# Patient Record
Sex: FEMALE | Race: WHITE | NOT HISPANIC OR LATINO
[De-identification: names, ages, dates, MRNs, and addresses within clinical notes are randomized per-mention and may not be internally consistent; named-entity substitution may affect disease eponyms.]

---

## 2017-06-21 ENCOUNTER — APPOINTMENT (OUTPATIENT)
Dept: SPINE | Facility: CLINIC | Age: 47
End: 2017-06-21

## 2017-06-21 VITALS
HEART RATE: 90 BPM | BODY MASS INDEX: 22.82 KG/M2 | HEIGHT: 66 IN | DIASTOLIC BLOOD PRESSURE: 74 MMHG | WEIGHT: 142 LBS | SYSTOLIC BLOOD PRESSURE: 107 MMHG

## 2017-06-21 DIAGNOSIS — Z85.9 PERSONAL HISTORY OF MALIGNANT NEOPLASM, UNSPECIFIED: ICD-10-CM

## 2017-06-21 DIAGNOSIS — Z86.39 PERSONAL HISTORY OF OTHER ENDOCRINE, NUTRITIONAL AND METABOLIC DISEASE: ICD-10-CM

## 2017-06-21 DIAGNOSIS — Z81.8 FAMILY HISTORY OF OTHER MENTAL AND BEHAVIORAL DISORDERS: ICD-10-CM

## 2017-06-21 DIAGNOSIS — Z80.0 FAMILY HISTORY OF MALIGNANT NEOPLASM OF DIGESTIVE ORGANS: ICD-10-CM

## 2017-06-21 RX ORDER — OXYCODONE HYDROCHLORIDE AND ACETAMINOPHEN 10; 325 MG/1; MG/1
10-325 TABLET ORAL
Refills: 0 | Status: ACTIVE | COMMUNITY

## 2017-07-12 ENCOUNTER — APPOINTMENT (OUTPATIENT)
Dept: SPINE | Facility: CLINIC | Age: 47
End: 2017-07-12

## 2017-07-12 VITALS
BODY MASS INDEX: 22.5 KG/M2 | DIASTOLIC BLOOD PRESSURE: 67 MMHG | WEIGHT: 140 LBS | SYSTOLIC BLOOD PRESSURE: 104 MMHG | HEART RATE: 87 BPM | HEIGHT: 66 IN

## 2017-09-01 ENCOUNTER — OUTPATIENT (OUTPATIENT)
Dept: OUTPATIENT SERVICES | Facility: HOSPITAL | Age: 47
LOS: 1 days | End: 2017-09-01
Payer: COMMERCIAL

## 2017-09-01 VITALS
HEART RATE: 71 BPM | WEIGHT: 132.94 LBS | RESPIRATION RATE: 20 BRPM | OXYGEN SATURATION: 98 % | DIASTOLIC BLOOD PRESSURE: 78 MMHG | SYSTOLIC BLOOD PRESSURE: 113 MMHG | HEIGHT: 66 IN | TEMPERATURE: 98 F

## 2017-09-01 DIAGNOSIS — Z98.890 OTHER SPECIFIED POSTPROCEDURAL STATES: Chronic | ICD-10-CM

## 2017-09-01 DIAGNOSIS — M50.20 OTHER CERVICAL DISC DISPLACEMENT, UNSPECIFIED CERVICAL REGION: ICD-10-CM

## 2017-09-01 DIAGNOSIS — Z01.818 ENCOUNTER FOR OTHER PREPROCEDURAL EXAMINATION: ICD-10-CM

## 2017-09-01 DIAGNOSIS — Z98.1 ARTHRODESIS STATUS: Chronic | ICD-10-CM

## 2017-09-01 DIAGNOSIS — Z09 ENCOUNTER FOR FOLLOW-UP EXAMINATION AFTER COMPLETED TREATMENT FOR CONDITIONS OTHER THAN MALIGNANT NEOPLASM: Chronic | ICD-10-CM

## 2017-09-01 DIAGNOSIS — Z87.59 PERSONAL HISTORY OF OTHER COMPLICATIONS OF PREGNANCY, CHILDBIRTH AND THE PUERPERIUM: Chronic | ICD-10-CM

## 2017-09-01 DIAGNOSIS — Z90.89 ACQUIRED ABSENCE OF OTHER ORGANS: Chronic | ICD-10-CM

## 2017-09-01 DIAGNOSIS — E89.0 POSTPROCEDURAL HYPOTHYROIDISM: Chronic | ICD-10-CM

## 2017-09-01 DIAGNOSIS — Z98.82 BREAST IMPLANT STATUS: Chronic | ICD-10-CM

## 2017-09-01 LAB
BLD GP AB SCN SERPL QL: NEGATIVE — SIGNIFICANT CHANGE UP
HCT VFR BLD CALC: 48.1 % — HIGH (ref 34.5–45)
HGB BLD-MCNC: 15.4 G/DL — SIGNIFICANT CHANGE UP (ref 11.5–15.5)
MCHC RBC-ENTMCNC: 31.8 PG — SIGNIFICANT CHANGE UP (ref 27–34)
MCHC RBC-ENTMCNC: 32 GM/DL — SIGNIFICANT CHANGE UP (ref 32–36)
MCV RBC AUTO: 99.2 FL — SIGNIFICANT CHANGE UP (ref 80–100)
PLATELET # BLD AUTO: 284 K/UL — SIGNIFICANT CHANGE UP (ref 150–400)
RBC # BLD: 4.85 M/UL — SIGNIFICANT CHANGE UP (ref 3.8–5.2)
RBC # FLD: 12.9 % — SIGNIFICANT CHANGE UP (ref 10.3–14.5)
RH IG SCN BLD-IMP: POSITIVE — SIGNIFICANT CHANGE UP
WBC # BLD: 10.21 K/UL — SIGNIFICANT CHANGE UP (ref 3.8–10.5)
WBC # FLD AUTO: 10.21 K/UL — SIGNIFICANT CHANGE UP (ref 3.8–10.5)

## 2017-09-01 PROCEDURE — 86901 BLOOD TYPING SEROLOGIC RH(D): CPT

## 2017-09-01 PROCEDURE — 86900 BLOOD TYPING SEROLOGIC ABO: CPT

## 2017-09-01 PROCEDURE — 86850 RBC ANTIBODY SCREEN: CPT

## 2017-09-01 PROCEDURE — G0463: CPT

## 2017-09-01 PROCEDURE — 85027 COMPLETE CBC AUTOMATED: CPT

## 2017-09-01 RX ORDER — CEFAZOLIN SODIUM 1 G
2000 VIAL (EA) INJECTION ONCE
Qty: 0 | Refills: 0 | Status: DISCONTINUED | OUTPATIENT
Start: 2017-09-08 | End: 2017-09-10

## 2017-09-01 NOTE — H&P PST ADULT - ACTIVITY
minimal due to neck pain, light housework, gorcery shopping, taking care of son, works as phlebotomist

## 2017-09-01 NOTE — H&P PST ADULT - NSANTHOSAYNRD_GEN_A_CORE
No. SUN screening performed.  STOP BANG Legend: 0-2 = LOW Risk; 3-4 = INTERMEDIATE Risk; 5-8 = HIGH Risk
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
## 2017-09-01 NOTE — H&P PST ADULT - PSH
S/P breast augmentation  saline  S/P carpal tunnel release    S/P cervical spinal fusion  x2  S/P ectopic pregnancy    S/P thyroidectomy    S/P tonsillectomy    S/P umbilical hernia repair, follow-up exam

## 2017-09-04 ENCOUNTER — TRANSCRIPTION ENCOUNTER (OUTPATIENT)
Age: 47
End: 2017-09-04

## 2017-09-08 ENCOUNTER — APPOINTMENT (OUTPATIENT)
Dept: SPINE | Facility: HOSPITAL | Age: 47
End: 2017-09-08

## 2017-09-08 ENCOUNTER — APPOINTMENT (OUTPATIENT)
Dept: OTOLARYNGOLOGY | Facility: HOSPITAL | Age: 47
End: 2017-09-08

## 2017-09-08 ENCOUNTER — TRANSCRIPTION ENCOUNTER (OUTPATIENT)
Age: 47
End: 2017-09-08

## 2017-09-08 ENCOUNTER — INPATIENT (INPATIENT)
Facility: HOSPITAL | Age: 47
LOS: 1 days | Discharge: HOME | DRG: 473 | End: 2017-09-10
Attending: NEUROLOGICAL SURGERY | Admitting: NEUROLOGICAL SURGERY
Payer: COMMERCIAL

## 2017-09-08 VITALS
HEART RATE: 76 BPM | HEIGHT: 66 IN | OXYGEN SATURATION: 97 % | RESPIRATION RATE: 20 BRPM | SYSTOLIC BLOOD PRESSURE: 109 MMHG | DIASTOLIC BLOOD PRESSURE: 74 MMHG | TEMPERATURE: 98 F | WEIGHT: 132.94 LBS

## 2017-09-08 DIAGNOSIS — Z98.82 BREAST IMPLANT STATUS: Chronic | ICD-10-CM

## 2017-09-08 DIAGNOSIS — Z87.59 PERSONAL HISTORY OF OTHER COMPLICATIONS OF PREGNANCY, CHILDBIRTH AND THE PUERPERIUM: Chronic | ICD-10-CM

## 2017-09-08 DIAGNOSIS — M50.20 OTHER CERVICAL DISC DISPLACEMENT, UNSPECIFIED CERVICAL REGION: ICD-10-CM

## 2017-09-08 DIAGNOSIS — E89.0 POSTPROCEDURAL HYPOTHYROIDISM: Chronic | ICD-10-CM

## 2017-09-08 DIAGNOSIS — Z98.890 OTHER SPECIFIED POSTPROCEDURAL STATES: Chronic | ICD-10-CM

## 2017-09-08 DIAGNOSIS — Z90.89 ACQUIRED ABSENCE OF OTHER ORGANS: Chronic | ICD-10-CM

## 2017-09-08 DIAGNOSIS — Z09 ENCOUNTER FOR FOLLOW-UP EXAMINATION AFTER COMPLETED TREATMENT FOR CONDITIONS OTHER THAN MALIGNANT NEOPLASM: Chronic | ICD-10-CM

## 2017-09-08 DIAGNOSIS — Z98.1 ARTHRODESIS STATUS: Chronic | ICD-10-CM

## 2017-09-08 LAB
ANION GAP SERPL CALC-SCNC: 11 MMOL/L — SIGNIFICANT CHANGE UP (ref 5–17)
BUN SERPL-MCNC: 8 MG/DL — SIGNIFICANT CHANGE UP (ref 7–23)
CALCIUM SERPL-MCNC: 7.8 MG/DL — LOW (ref 8.4–10.5)
CHLORIDE SERPL-SCNC: 111 MMOL/L — HIGH (ref 96–108)
CO2 SERPL-SCNC: 21 MMOL/L — LOW (ref 22–31)
CREAT SERPL-MCNC: 0.63 MG/DL — SIGNIFICANT CHANGE UP (ref 0.5–1.3)
GLUCOSE SERPL-MCNC: 130 MG/DL — HIGH (ref 70–99)
HCG UR QL: NEGATIVE — SIGNIFICANT CHANGE UP
HCT VFR BLD CALC: 40.2 % — SIGNIFICANT CHANGE UP (ref 34.5–45)
HGB BLD-MCNC: 13.4 G/DL — SIGNIFICANT CHANGE UP (ref 11.5–15.5)
MAGNESIUM SERPL-MCNC: 1.7 MG/DL — SIGNIFICANT CHANGE UP (ref 1.6–2.6)
MCHC RBC-ENTMCNC: 33.2 GM/DL — SIGNIFICANT CHANGE UP (ref 32–36)
MCHC RBC-ENTMCNC: 33.5 PG — SIGNIFICANT CHANGE UP (ref 27–34)
MCV RBC AUTO: 101 FL — HIGH (ref 80–100)
PHOSPHATE SERPL-MCNC: 2.8 MG/DL — SIGNIFICANT CHANGE UP (ref 2.5–4.5)
PLATELET # BLD AUTO: 197 K/UL — SIGNIFICANT CHANGE UP (ref 150–400)
POTASSIUM SERPL-MCNC: 3.8 MMOL/L — SIGNIFICANT CHANGE UP (ref 3.5–5.3)
POTASSIUM SERPL-SCNC: 3.8 MMOL/L — SIGNIFICANT CHANGE UP (ref 3.5–5.3)
RBC # BLD: 3.99 M/UL — SIGNIFICANT CHANGE UP (ref 3.8–5.2)
RBC # FLD: 11.4 % — SIGNIFICANT CHANGE UP (ref 10.3–14.5)
SODIUM SERPL-SCNC: 143 MMOL/L — SIGNIFICANT CHANGE UP (ref 135–145)
WBC # BLD: 7.8 K/UL — SIGNIFICANT CHANGE UP (ref 3.8–10.5)
WBC # FLD AUTO: 7.8 K/UL — SIGNIFICANT CHANGE UP (ref 3.8–10.5)

## 2017-09-08 PROCEDURE — 22552 ARTHRD ANT NTRBD CERVICAL EA: CPT | Mod: 62

## 2017-09-08 PROCEDURE — 22551 ARTHRD ANT NTRBDY CERVICAL: CPT | Mod: 62

## 2017-09-08 PROCEDURE — 22853 INSJ BIOMECHANICAL DEVICE: CPT | Mod: 59

## 2017-09-08 RX ORDER — SODIUM CHLORIDE 9 MG/ML
3 INJECTION INTRAMUSCULAR; INTRAVENOUS; SUBCUTANEOUS EVERY 8 HOURS
Qty: 0 | Refills: 0 | Status: DISCONTINUED | OUTPATIENT
Start: 2017-09-08 | End: 2017-09-08

## 2017-09-08 RX ORDER — SENNA PLUS 8.6 MG/1
2 TABLET ORAL AT BEDTIME
Qty: 0 | Refills: 0 | Status: DISCONTINUED | OUTPATIENT
Start: 2017-09-08 | End: 2017-09-10

## 2017-09-08 RX ORDER — LEVOTHYROXINE SODIUM 125 MCG
175 TABLET ORAL DAILY
Qty: 0 | Refills: 0 | Status: DISCONTINUED | OUTPATIENT
Start: 2017-09-08 | End: 2017-09-10

## 2017-09-08 RX ORDER — CYCLOBENZAPRINE HYDROCHLORIDE 10 MG/1
10 TABLET, FILM COATED ORAL EVERY 8 HOURS
Qty: 0 | Refills: 0 | Status: DISCONTINUED | OUTPATIENT
Start: 2017-09-08 | End: 2017-09-10

## 2017-09-08 RX ORDER — DOCUSATE SODIUM 100 MG
100 CAPSULE ORAL THREE TIMES A DAY
Qty: 0 | Refills: 0 | Status: DISCONTINUED | OUTPATIENT
Start: 2017-09-08 | End: 2017-09-10

## 2017-09-08 RX ORDER — DEXTROSE MONOHYDRATE, SODIUM CHLORIDE, AND POTASSIUM CHLORIDE 50; .745; 4.5 G/1000ML; G/1000ML; G/1000ML
1000 INJECTION, SOLUTION INTRAVENOUS
Qty: 0 | Refills: 0 | Status: DISCONTINUED | OUTPATIENT
Start: 2017-09-08 | End: 2017-09-10

## 2017-09-08 RX ORDER — DEXAMETHASONE 0.5 MG/5ML
4 ELIXIR ORAL EVERY 6 HOURS
Qty: 0 | Refills: 0 | Status: COMPLETED | OUTPATIENT
Start: 2017-09-08 | End: 2017-09-09

## 2017-09-08 RX ORDER — ONDANSETRON 8 MG/1
4 TABLET, FILM COATED ORAL EVERY 6 HOURS
Qty: 0 | Refills: 0 | Status: DISCONTINUED | OUTPATIENT
Start: 2017-09-08 | End: 2017-09-10

## 2017-09-08 RX ORDER — HYDROMORPHONE HYDROCHLORIDE 2 MG/ML
0.5 INJECTION INTRAMUSCULAR; INTRAVENOUS; SUBCUTANEOUS
Qty: 0 | Refills: 0 | Status: DISCONTINUED | OUTPATIENT
Start: 2017-09-08 | End: 2017-09-08

## 2017-09-08 RX ORDER — ACETAMINOPHEN 500 MG
1000 TABLET ORAL ONCE
Qty: 0 | Refills: 0 | Status: DISCONTINUED | OUTPATIENT
Start: 2017-09-08 | End: 2017-09-08

## 2017-09-08 RX ORDER — ACETAMINOPHEN 500 MG
650 TABLET ORAL EVERY 6 HOURS
Qty: 0 | Refills: 0 | Status: DISCONTINUED | OUTPATIENT
Start: 2017-09-08 | End: 2017-09-10

## 2017-09-08 RX ORDER — ACETAMINOPHEN 500 MG
1000 TABLET ORAL ONCE
Qty: 0 | Refills: 0 | Status: COMPLETED | OUTPATIENT
Start: 2017-09-08 | End: 2017-09-08

## 2017-09-08 RX ORDER — CEFAZOLIN SODIUM 1 G
2000 VIAL (EA) INJECTION EVERY 8 HOURS
Qty: 0 | Refills: 0 | Status: COMPLETED | OUTPATIENT
Start: 2017-09-08 | End: 2017-09-09

## 2017-09-08 RX ORDER — OXYCODONE HYDROCHLORIDE 5 MG/1
10 TABLET ORAL EVERY 4 HOURS
Qty: 0 | Refills: 0 | Status: DISCONTINUED | OUTPATIENT
Start: 2017-09-08 | End: 2017-09-10

## 2017-09-08 RX ADMIN — CYCLOBENZAPRINE HYDROCHLORIDE 10 MILLIGRAM(S): 10 TABLET, FILM COATED ORAL at 14:28

## 2017-09-08 RX ADMIN — SENNA PLUS 2 TABLET(S): 8.6 TABLET ORAL at 21:13

## 2017-09-08 RX ADMIN — Medication 4 MILLIGRAM(S): at 16:28

## 2017-09-08 RX ADMIN — HYDROMORPHONE HYDROCHLORIDE 0.5 MILLIGRAM(S): 2 INJECTION INTRAMUSCULAR; INTRAVENOUS; SUBCUTANEOUS at 17:30

## 2017-09-08 RX ADMIN — OXYCODONE HYDROCHLORIDE 10 MILLIGRAM(S): 5 TABLET ORAL at 20:35

## 2017-09-08 RX ADMIN — HYDROMORPHONE HYDROCHLORIDE 0.5 MILLIGRAM(S): 2 INJECTION INTRAMUSCULAR; INTRAVENOUS; SUBCUTANEOUS at 16:32

## 2017-09-08 RX ADMIN — DEXTROSE MONOHYDRATE, SODIUM CHLORIDE, AND POTASSIUM CHLORIDE 125 MILLILITER(S): 50; .745; 4.5 INJECTION, SOLUTION INTRAVENOUS at 14:16

## 2017-09-08 RX ADMIN — DEXTROSE MONOHYDRATE, SODIUM CHLORIDE, AND POTASSIUM CHLORIDE 125 MILLILITER(S): 50; .745; 4.5 INJECTION, SOLUTION INTRAVENOUS at 21:14

## 2017-09-08 RX ADMIN — Medication 1000 MILLIGRAM(S): at 18:37

## 2017-09-08 RX ADMIN — OXYCODONE HYDROCHLORIDE 10 MILLIGRAM(S): 5 TABLET ORAL at 21:05

## 2017-09-08 RX ADMIN — HYDROMORPHONE HYDROCHLORIDE 0.5 MILLIGRAM(S): 2 INJECTION INTRAMUSCULAR; INTRAVENOUS; SUBCUTANEOUS at 15:32

## 2017-09-08 RX ADMIN — HYDROMORPHONE HYDROCHLORIDE 0.5 MILLIGRAM(S): 2 INJECTION INTRAMUSCULAR; INTRAVENOUS; SUBCUTANEOUS at 12:28

## 2017-09-08 RX ADMIN — Medication 100 MILLIGRAM(S): at 16:08

## 2017-09-08 RX ADMIN — HYDROMORPHONE HYDROCHLORIDE 0.5 MILLIGRAM(S): 2 INJECTION INTRAMUSCULAR; INTRAVENOUS; SUBCUTANEOUS at 12:45

## 2017-09-08 RX ADMIN — HYDROMORPHONE HYDROCHLORIDE 0.5 MILLIGRAM(S): 2 INJECTION INTRAMUSCULAR; INTRAVENOUS; SUBCUTANEOUS at 15:00

## 2017-09-08 RX ADMIN — Medication 100 MILLIGRAM(S): at 21:13

## 2017-09-08 RX ADMIN — HYDROMORPHONE HYDROCHLORIDE 0.5 MILLIGRAM(S): 2 INJECTION INTRAMUSCULAR; INTRAVENOUS; SUBCUTANEOUS at 13:33

## 2017-09-08 RX ADMIN — CYCLOBENZAPRINE HYDROCHLORIDE 10 MILLIGRAM(S): 10 TABLET, FILM COATED ORAL at 21:13

## 2017-09-08 RX ADMIN — HYDROMORPHONE HYDROCHLORIDE 0.5 MILLIGRAM(S): 2 INJECTION INTRAMUSCULAR; INTRAVENOUS; SUBCUTANEOUS at 13:15

## 2017-09-08 RX ADMIN — Medication 400 MILLIGRAM(S): at 18:28

## 2017-09-08 RX ADMIN — Medication 4 MILLIGRAM(S): at 23:14

## 2017-09-08 RX ADMIN — ONDANSETRON 4 MILLIGRAM(S): 8 TABLET, FILM COATED ORAL at 20:19

## 2017-09-08 NOTE — PATIENT PROFILE ADULT. - FUNCTIONAL LEVEL PRIOR: BATHING
[facility] [place] [facility] [place] [address] [address]  [id number]  [phone]               Thank you for choosing us for your health care visit with Nurse. We are glad to serve you and happy to provide you with this summary of your visit. Twice daily as directed   Commonly known as:  KENALOG           * ZOLOFT 50 MG Tabs   Generic drug:  Sertraline HCl   Take 50 mg by mouth daily. * Sertraline HCl 100 MG Tabs   Commonly known as:  ZOLOFT           * Notice:   This list has 6 medica (0) independent

## 2017-09-08 NOTE — PROGRESS NOTE ADULT - SUBJECTIVE AND OBJECTIVE BOX
Patient seen and examined at bedside.    T(C): 36.6 (09-08-17 @ 19:00), Max: 36.7 (09-08-17 @ 06:36)  HR: 67 (09-08-17 @ 19:00) (63 - 79)  BP: 108/65 (09-08-17 @ 19:00) (94/52 - 109/74)  RR: 16 (09-08-17 @ 19:00) (15 - 20)  SpO2: 100% (09-08-17 @ 19:00) (95% - 100%)  Wt(kg): --    Exam:  PHYSICAL EXAM:    Constitutional: No Acute Distress     Neurological: AOx3, Following Commands    Motor exam:          Upper extremity                         Delt     Bicep     Tricep    HG                                                 R         5/5        5/5        5/5       5/5                                               L          5/5        5/5        5/5       5/5          Lower extremity                        HF         KF        KE       DF         PF                                                  R        5/5        5/5        5/5       5/5         5/5                                               L         5/5        5/5       5/5       5/5          5/5                                                 Sensation: [x] intact to light touch  [] decreased:     No clonus
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
## 2017-09-09 RX ADMIN — OXYCODONE HYDROCHLORIDE 10 MILLIGRAM(S): 5 TABLET ORAL at 17:40

## 2017-09-09 RX ADMIN — Medication 175 MICROGRAM(S): at 05:55

## 2017-09-09 RX ADMIN — OXYCODONE HYDROCHLORIDE 10 MILLIGRAM(S): 5 TABLET ORAL at 03:40

## 2017-09-09 RX ADMIN — Medication 100 MILLIGRAM(S): at 21:50

## 2017-09-09 RX ADMIN — OXYCODONE HYDROCHLORIDE 10 MILLIGRAM(S): 5 TABLET ORAL at 09:00

## 2017-09-09 RX ADMIN — Medication 100 MILLIGRAM(S): at 09:00

## 2017-09-09 RX ADMIN — OXYCODONE HYDROCHLORIDE 10 MILLIGRAM(S): 5 TABLET ORAL at 17:10

## 2017-09-09 RX ADMIN — OXYCODONE HYDROCHLORIDE 10 MILLIGRAM(S): 5 TABLET ORAL at 21:51

## 2017-09-09 RX ADMIN — Medication 4 MILLIGRAM(S): at 12:23

## 2017-09-09 RX ADMIN — Medication 4 MILLIGRAM(S): at 05:55

## 2017-09-09 RX ADMIN — CYCLOBENZAPRINE HYDROCHLORIDE 10 MILLIGRAM(S): 10 TABLET, FILM COATED ORAL at 05:55

## 2017-09-09 RX ADMIN — OXYCODONE HYDROCHLORIDE 10 MILLIGRAM(S): 5 TABLET ORAL at 13:57

## 2017-09-09 RX ADMIN — Medication 100 MILLIGRAM(S): at 05:55

## 2017-09-09 RX ADMIN — OXYCODONE HYDROCHLORIDE 10 MILLIGRAM(S): 5 TABLET ORAL at 22:21

## 2017-09-09 RX ADMIN — Medication 100 MILLIGRAM(S): at 01:00

## 2017-09-09 RX ADMIN — CYCLOBENZAPRINE HYDROCHLORIDE 10 MILLIGRAM(S): 10 TABLET, FILM COATED ORAL at 13:22

## 2017-09-09 RX ADMIN — Medication 100 MILLIGRAM(S): at 13:22

## 2017-09-09 RX ADMIN — OXYCODONE HYDROCHLORIDE 10 MILLIGRAM(S): 5 TABLET ORAL at 13:23

## 2017-09-09 RX ADMIN — SENNA PLUS 2 TABLET(S): 8.6 TABLET ORAL at 21:51

## 2017-09-09 RX ADMIN — CYCLOBENZAPRINE HYDROCHLORIDE 10 MILLIGRAM(S): 10 TABLET, FILM COATED ORAL at 22:25

## 2017-09-09 RX ADMIN — OXYCODONE HYDROCHLORIDE 10 MILLIGRAM(S): 5 TABLET ORAL at 09:30

## 2017-09-09 NOTE — PROGRESS NOTE ADULT - SUBJECTIVE AND OBJECTIVE BOX
Patient seen and examined at bedside. No overnight events. Complaining of dysphagia, unable to tolerate solid diet.     ICU Vital Signs Last 24 Hrs  T(C): 36.7 (08 Sep 2017 21:11), Max: 36.7 (08 Sep 2017 21:11)  T(F): 98.1 (08 Sep 2017 21:11), Max: 98.1 (08 Sep 2017 21:11)  HR: 63 (08 Sep 2017 21:11) (63 - 79)  BP: 99/60 (08 Sep 2017 21:11) (94/52 - 110/63)  BP(mean): 74 (08 Sep 2017 17:00) (68 - 74)  ABP: 120/78 (08 Sep 2017 17:30) (98/52 - 120/78)  ABP(mean): 96 (08 Sep 2017 17:30) (70 - 96)  RR: 18 (08 Sep 2017 21:11) (15 - 18)  SpO2: 95% (08 Sep 2017 21:11) (95% - 100%)  T(C): 36.6 (09-08-17 @ 19:00), Max: 36.7 (09-08-17 @ 06:36)  HR: 67 (09-08-17 @ 19:00) (63 - 79)  BP: 108/65 (09-08-17 @ 19:00) (94/52 - 109/74)  RR: 16 (09-08-17 @ 19:00) (15 - 20)  SpO2: 100% (09-08-17 @ 19:00) (95% - 100%)  Wt(kg): --    Exam:  PHYSICAL EXAM:    Constitutional: No Acute Distress     Neurological: AOx3, Following Commands    Motor exam:          Upper extremity                         Delt     Bicep     Tricep    HG                                                 R         5/5        5/5        5/5       5/5                                               L          5/5        5/5        5/5       5/5          Lower extremity                        HF         KF        KE       DF         PF                                                  R        5/5        5/5        5/5       5/5         5/5                                               L         5/5        5/5       5/5       5/5          5/5                                                 Sensation: [x] intact to light touch  [] decreased:     No clonus      Assessment and Plan:   · Assessment		  47F POD # 1 s/p C3-C4 and c6-7 reexploration acdf.  -discontinue HMV (total output 10)  -4 doses of dex  -advance diet as tolerated

## 2017-09-10 ENCOUNTER — TRANSCRIPTION ENCOUNTER (OUTPATIENT)
Age: 47
End: 2017-09-10

## 2017-09-10 VITALS
HEART RATE: 70 BPM | DIASTOLIC BLOOD PRESSURE: 59 MMHG | TEMPERATURE: 98 F | RESPIRATION RATE: 16 BRPM | SYSTOLIC BLOOD PRESSURE: 103 MMHG | OXYGEN SATURATION: 94 %

## 2017-09-10 PROCEDURE — C1769: CPT

## 2017-09-10 PROCEDURE — 80048 BASIC METABOLIC PNL TOTAL CA: CPT

## 2017-09-10 PROCEDURE — C1889: CPT

## 2017-09-10 PROCEDURE — 85027 COMPLETE CBC AUTOMATED: CPT

## 2017-09-10 PROCEDURE — 97161 PT EVAL LOW COMPLEX 20 MIN: CPT

## 2017-09-10 PROCEDURE — 84100 ASSAY OF PHOSPHORUS: CPT

## 2017-09-10 PROCEDURE — 76000 FLUOROSCOPY <1 HR PHYS/QHP: CPT

## 2017-09-10 PROCEDURE — 83735 ASSAY OF MAGNESIUM: CPT

## 2017-09-10 PROCEDURE — C1713: CPT

## 2017-09-10 PROCEDURE — 81025 URINE PREGNANCY TEST: CPT

## 2017-09-10 RX ORDER — OXYCODONE HYDROCHLORIDE 5 MG/1
1 TABLET ORAL
Qty: 30 | Refills: 0
Start: 2017-09-10

## 2017-09-10 RX ORDER — LEVOTHYROXINE SODIUM 125 MCG
1 TABLET ORAL
Qty: 0 | Refills: 0 | DISCHARGE
Start: 2017-09-10

## 2017-09-10 RX ORDER — CYCLOBENZAPRINE HYDROCHLORIDE 10 MG/1
1 TABLET, FILM COATED ORAL
Qty: 30 | Refills: 0
Start: 2017-09-10

## 2017-09-10 RX ORDER — DOCUSATE SODIUM 100 MG
1 CAPSULE ORAL
Qty: 0 | Refills: 0 | DISCHARGE
Start: 2017-09-10

## 2017-09-10 RX ORDER — ACETAMINOPHEN 500 MG
2 TABLET ORAL
Qty: 0 | Refills: 0 | DISCHARGE
Start: 2017-09-10

## 2017-09-10 RX ORDER — SENNA PLUS 8.6 MG/1
2 TABLET ORAL
Qty: 0 | Refills: 0 | DISCHARGE
Start: 2017-09-10

## 2017-09-10 RX ORDER — ACETAMINOPHEN 500 MG
1000 TABLET ORAL ONCE
Qty: 0 | Refills: 0 | Status: COMPLETED | OUTPATIENT
Start: 2017-09-10 | End: 2017-09-10

## 2017-09-10 RX ORDER — LEVOTHYROXINE SODIUM 125 MCG
1 TABLET ORAL
Qty: 0 | Refills: 0 | COMMUNITY

## 2017-09-10 RX ADMIN — OXYCODONE HYDROCHLORIDE 10 MILLIGRAM(S): 5 TABLET ORAL at 13:45

## 2017-09-10 RX ADMIN — Medication 1000 MILLIGRAM(S): at 09:00

## 2017-09-10 RX ADMIN — CYCLOBENZAPRINE HYDROCHLORIDE 10 MILLIGRAM(S): 10 TABLET, FILM COATED ORAL at 07:10

## 2017-09-10 RX ADMIN — OXYCODONE HYDROCHLORIDE 10 MILLIGRAM(S): 5 TABLET ORAL at 13:12

## 2017-09-10 RX ADMIN — OXYCODONE HYDROCHLORIDE 10 MILLIGRAM(S): 5 TABLET ORAL at 09:00

## 2017-09-10 RX ADMIN — CYCLOBENZAPRINE HYDROCHLORIDE 10 MILLIGRAM(S): 10 TABLET, FILM COATED ORAL at 13:12

## 2017-09-10 RX ADMIN — Medication 175 MICROGRAM(S): at 07:10

## 2017-09-10 RX ADMIN — Medication 100 MILLIGRAM(S): at 13:12

## 2017-09-10 RX ADMIN — Medication 400 MILLIGRAM(S): at 09:30

## 2017-09-10 RX ADMIN — OXYCODONE HYDROCHLORIDE 10 MILLIGRAM(S): 5 TABLET ORAL at 08:32

## 2017-09-10 RX ADMIN — OXYCODONE HYDROCHLORIDE 10 MILLIGRAM(S): 5 TABLET ORAL at 17:41

## 2017-09-10 RX ADMIN — Medication 100 MILLIGRAM(S): at 07:10

## 2017-09-10 NOTE — DISCHARGE NOTE ADULT - PATIENT PORTAL LINK FT
“You can access the Follow[facility] Patient Portal, offered by [facility], by registering with the following website: [email or web address]”

## 2017-09-10 NOTE — DISCHARGE NOTE ADULT - CARE PROVIDER_API CALL
Zeus Cagle (MD), Neurological Surgery  [address] [place] [address] [address]  [place] [address]  Phone: [phone]  Fax: [phone]

## 2017-09-10 NOTE — DISCHARGE NOTE ADULT - MEDICATION SUMMARY - MEDICATIONS TO TAKE
I will START or STAY ON the medications listed below when I get home from the hospital:    acetaminophen 325 mg oral tablet  -- 2 tab(s) by mouth every 6 hours, As needed, Mild Pain (1 - 3)  -- Indication: For Mild pain    Percocet 10/325 oral tablet  -- 1 tab(s) by mouth every 6 hours, As Needed MDD:5 tablets  -- Indication: For Severe pain    senna oral tablet  -- 2 tab(s) by mouth once a day (at bedtime)  -- Indication: For Constipation    docusate sodium 100 mg oral capsule  -- 1 cap(s) by mouth 3 times a day  -- Indication: For Constipation    cyclobenzaprine 10 mg oral tablet  -- 1 tab(s) by mouth every 8 hours, As Needed -for muscle spasm   -- Indication: For Muscle spasm    levothyroxine 175 mcg (0.175 mg) oral tablet  -- 1 tab(s) by mouth once a day  -- Indication: For hypothyroid I will START or STAY ON the medications listed below when I get home from the hospital:    acetaminophen 325 mg oral tablet  -- 2 tab(s) by mouth every 6 hours, As needed, Mild Pain (1 - 3)  -- Indication: For Mild pain    oxyCODONE 10 mg oral tablet  -- 1 tab(s) by mouth every 6 hours, As Needed for severe pain MDD:4  tablets  -- Caution federal law prohibits the transfer of this drug to any person other  than the person for whom it was prescribed.  Check with your doctor before becoming pregnant.  Do not drink alcoholic beverages when taking this medication.  May cause drowsiness.  Alcohol may intensify this effect.  Use care when operating dangerous machinery.  This drug may impair the ability to drive or operate machinery.  Use care until you become familiar with its effects.  This prescription cannot be refilled.  Using more of this medication than prescribed may cause serious breathing problems.    -- Indication: For Severe pain    senna oral tablet  -- 2 tab(s) by mouth once a day (at bedtime)  -- Indication: For Constipation    docusate sodium 100 mg oral capsule  -- 1 cap(s) by mouth 3 times a day  -- Indication: For Constipation    cyclobenzaprine 10 mg oral tablet  -- 1 tab(s) by mouth every 8 hours, As Needed -for muscle spasm   -- Indication: For Muscle spasm    levothyroxine 175 mcg (0.175 mg) oral tablet  -- 1 tab(s) by mouth once a day  -- Indication: For hypothyroid
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
## 2017-09-10 NOTE — DISCHARGE NOTE ADULT - PLAN OF CARE
Reduction in pain Call Neurosurgery Dr. Cagle for appointment in 1 week for wound check.  Follow up with your Private MD in 1-2 weeks.  Return to Emergency Department or contact your Neurosurgeon if any changes in mental status, weakness, numbness or tingling of extremities; difficulty swallowing; drainage or redness of wound, fever; pain in legs; difficulty urinating or constipation.  Do not restart your Aspirin or take any Motrin/NSAIDS until checking with your Neurosurgeon.

## 2017-09-10 NOTE — DISCHARGE NOTE ADULT - CARE PLAN
Principal Discharge DX:	Other cervical disc displacement, unspecified cervical region  Goal:	Reduction in pain  Instructions for follow-up, activity and diet:	Call Neurosurgery Dr. Cagle for appointment in 1 week for wound check.  Follow up with your Private MD in 1-2 weeks.  Return to Emergency Department or contact your Neurosurgeon if any changes in mental status, weakness, numbness or tingling of extremities; difficulty swallowing; drainage or redness of wound, fever; pain in legs; difficulty urinating or constipation.  Do not restart your Aspirin or take any Motrin/NSAIDS until checking with your Neurosurgeon.

## 2017-09-10 NOTE — PROGRESS NOTE ADULT - ASSESSMENT
sp C3-C4 and c6-7 reexploration acdf.    -pt  -q4 neurochecks  -C outputs  -4 doses of dex
47F s/p C3-4 and C6-7 re-exploration of revision ACDF with post-operative dysphagia    -Pain control  -If tolerating diet, possible DC home today  -Neurochecks q4h  -DVT ppx  -PT-outpatient PT  -HMV DC'd 9/9  -hypothyroidism- on levothyroxine

## 2017-09-10 NOTE — DISCHARGE NOTE ADULT - INSTRUCTIONS
OK to shower. Do not scrub incision or steri-strips. Do not remove steri-strips; they will fall off on their own. No diet restrictions. Advance diet as tolerated.
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
## 2017-09-10 NOTE — DISCHARGE NOTE ADULT - NS AS ACTIVITY OBS
Walking-Indoors allowed/Sex allowed/No Heavy lifting/straining/Walking-Outdoors allowed/Stairs allowed/Return to Work/School allowed/Driving allowed/Showering allowed

## 2017-09-12 ENCOUNTER — OTHER (OUTPATIENT)
Age: 47
End: 2017-09-12

## 2017-09-15 ENCOUNTER — APPOINTMENT (OUTPATIENT)
Dept: SPINE | Facility: CLINIC | Age: 47
End: 2017-09-15
Payer: OTHER MISCELLANEOUS

## 2017-09-15 VITALS
SYSTOLIC BLOOD PRESSURE: 94 MMHG | WEIGHT: 140 LBS | HEIGHT: 66 IN | HEART RATE: 98 BPM | BODY MASS INDEX: 22.5 KG/M2 | RESPIRATION RATE: 14 BRPM | TEMPERATURE: 98.9 F | DIASTOLIC BLOOD PRESSURE: 62 MMHG

## 2017-09-15 PROCEDURE — 99024 POSTOP FOLLOW-UP VISIT: CPT

## 2017-09-15 RX ORDER — ONDANSETRON 4 MG/1
4 TABLET, ORALLY DISINTEGRATING ORAL
Qty: 15 | Refills: 0 | Status: COMPLETED | COMMUNITY
Start: 2017-04-06

## 2017-09-15 RX ORDER — TOPIRAMATE 25 MG/1
25 TABLET, FILM COATED ORAL
Qty: 60 | Refills: 0 | Status: DISCONTINUED | COMMUNITY
Start: 2017-05-16 | End: 2017-09-15

## 2017-09-19 ENCOUNTER — TRANSCRIPTION ENCOUNTER (OUTPATIENT)
Age: 47
End: 2017-09-19

## 2017-09-26 ENCOUNTER — OTHER (OUTPATIENT)
Age: 47
End: 2017-09-26

## 2017-10-04 ENCOUNTER — APPOINTMENT (OUTPATIENT)
Dept: SPINE | Facility: CLINIC | Age: 47
End: 2017-10-04
Payer: OTHER MISCELLANEOUS

## 2017-10-04 VITALS
HEIGHT: 66 IN | SYSTOLIC BLOOD PRESSURE: 113 MMHG | BODY MASS INDEX: 21.38 KG/M2 | HEART RATE: 99 BPM | DIASTOLIC BLOOD PRESSURE: 76 MMHG | WEIGHT: 133 LBS

## 2017-10-04 PROCEDURE — 99024 POSTOP FOLLOW-UP VISIT: CPT

## 2017-10-04 RX ORDER — CYCLOBENZAPRINE HYDROCHLORIDE 10 MG/1
10 TABLET, FILM COATED ORAL
Qty: 30 | Refills: 0 | Status: COMPLETED | COMMUNITY
Start: 2017-04-06 | End: 2017-10-04

## 2017-10-04 RX ORDER — IBUPROFEN 200 MG/1
TABLET, COATED ORAL
Refills: 0 | Status: ACTIVE | COMMUNITY

## 2017-10-04 RX ORDER — AMITRIPTYLINE HYDROCHLORIDE 25 MG/1
25 TABLET, FILM COATED ORAL
Qty: 30 | Refills: 0 | Status: COMPLETED | COMMUNITY
Start: 2017-06-07 | End: 2017-10-04

## 2017-10-10 ENCOUNTER — APPOINTMENT (OUTPATIENT)
Dept: OTOLARYNGOLOGY | Facility: CLINIC | Age: 47
End: 2017-10-10
Payer: OTHER MISCELLANEOUS

## 2017-10-10 VITALS
HEART RATE: 83 BPM | SYSTOLIC BLOOD PRESSURE: 104 MMHG | WEIGHT: 135 LBS | BODY MASS INDEX: 21.69 KG/M2 | HEIGHT: 66 IN | DIASTOLIC BLOOD PRESSURE: 71 MMHG

## 2017-10-10 DIAGNOSIS — R42 DIZZINESS AND GIDDINESS: ICD-10-CM

## 2017-10-10 DIAGNOSIS — R49.9 UNSPECIFIED VOICE AND RESONANCE DISORDER: ICD-10-CM

## 2017-10-10 DIAGNOSIS — Z09 ENCOUNTER FOR FOLLOW-UP EXAMINATION AFTER COMPLETED TREATMENT FOR CONDITIONS OTHER THAN MALIGNANT NEOPLASM: ICD-10-CM

## 2017-10-10 PROCEDURE — 99024 POSTOP FOLLOW-UP VISIT: CPT

## 2017-10-10 PROCEDURE — 31575 DIAGNOSTIC LARYNGOSCOPY: CPT

## 2017-10-10 RX ORDER — AMOXICILLIN AND CLAVULANATE POTASSIUM 875; 125 MG/1; MG/1
875-125 TABLET, COATED ORAL
Qty: 20 | Refills: 0 | Status: DISCONTINUED | COMMUNITY
Start: 2017-09-29 | End: 2017-10-10

## 2017-10-10 NOTE — BRIEF OPERATIVE NOTE - BRIEF OP NOTE DRAINS
Assessment  DMT2: 87y Female with DM T2 with hyperglycemia on insulin, with neuropathy, with nephropathy, blood sugars improving, on high dose steroids , eating meals,  non compliant with low carb diet.  HTN: Controlled, On med.  HLD:  On statin, tolerating.  SOB: Continue Tx FU with primary team Hemovac

## 2017-10-24 ENCOUNTER — APPOINTMENT (OUTPATIENT)
Dept: OTOLARYNGOLOGY | Facility: CLINIC | Age: 47
End: 2017-10-24
Payer: OTHER MISCELLANEOUS

## 2017-10-24 VITALS — DIASTOLIC BLOOD PRESSURE: 74 MMHG | HEART RATE: 75 BPM | HEIGHT: 66 IN | SYSTOLIC BLOOD PRESSURE: 106 MMHG

## 2017-10-24 DIAGNOSIS — H81.12 BENIGN PAROXYSMAL VERTIGO, LEFT EAR: ICD-10-CM

## 2017-10-24 DIAGNOSIS — R42 DIZZINESS AND GIDDINESS: ICD-10-CM

## 2017-10-24 DIAGNOSIS — R51 HEADACHE: ICD-10-CM

## 2017-10-24 PROCEDURE — 99214 OFFICE O/P EST MOD 30 MIN: CPT

## 2017-10-25 PROBLEM — R51 HEADACHE: Status: ACTIVE | Noted: 2017-10-25

## 2017-10-25 PROBLEM — R42 CERVICAL VERTIGO: Status: ACTIVE | Noted: 2017-10-25

## 2017-11-03 ENCOUNTER — OTHER (OUTPATIENT)
Age: 47
End: 2017-11-03

## 2017-12-06 ENCOUNTER — APPOINTMENT (OUTPATIENT)
Dept: SPINE | Facility: CLINIC | Age: 47
End: 2017-12-06
Payer: OTHER MISCELLANEOUS

## 2017-12-06 VITALS
HEIGHT: 66 IN | BODY MASS INDEX: 21.86 KG/M2 | SYSTOLIC BLOOD PRESSURE: 102 MMHG | WEIGHT: 136 LBS | HEART RATE: 68 BPM | DIASTOLIC BLOOD PRESSURE: 70 MMHG

## 2017-12-06 PROCEDURE — 99024 POSTOP FOLLOW-UP VISIT: CPT

## 2017-12-06 RX ORDER — TINIDAZOLE 500 MG/1
500 TABLET, FILM COATED ORAL
Qty: 8 | Refills: 0 | Status: COMPLETED | COMMUNITY
Start: 2017-11-09

## 2017-12-06 RX ORDER — MULTIVITAMIN
TABLET ORAL
Refills: 0 | Status: ACTIVE | COMMUNITY

## 2017-12-06 RX ORDER — FLUCONAZOLE 100 MG/1
100 TABLET ORAL
Qty: 3 | Refills: 0 | Status: COMPLETED | COMMUNITY
Start: 2017-10-02

## 2017-12-06 RX ORDER — AZELASTINE HYDROCHLORIDE 137 UG/1
0.1 SPRAY, METERED NASAL
Qty: 30 | Refills: 0 | Status: COMPLETED | COMMUNITY
Start: 2017-11-08

## 2017-12-06 RX ORDER — ALBUTEROL SULFATE 90 UG/1
108 (90 BASE) AEROSOL, METERED RESPIRATORY (INHALATION)
Qty: 18 | Refills: 0 | Status: COMPLETED | COMMUNITY
Start: 2017-11-08

## 2017-12-06 RX ORDER — PREDNISONE 20 MG/1
20 TABLET ORAL
Qty: 10 | Refills: 0 | Status: COMPLETED | COMMUNITY
Start: 2017-11-08

## 2017-12-06 RX ORDER — ROSUVASTATIN CALCIUM 10 MG/1
10 TABLET, FILM COATED ORAL
Refills: 0 | Status: ACTIVE | COMMUNITY

## 2017-12-06 RX ORDER — FLUCONAZOLE 150 MG/1
150 TABLET ORAL
Qty: 4 | Refills: 0 | Status: COMPLETED | COMMUNITY
Start: 2017-11-09

## 2018-01-08 ENCOUNTER — RX RENEWAL (OUTPATIENT)
Age: 48
End: 2018-01-08

## 2018-03-07 ENCOUNTER — APPOINTMENT (OUTPATIENT)
Dept: SPINE | Facility: CLINIC | Age: 48
End: 2018-03-07
Payer: OTHER MISCELLANEOUS

## 2018-03-07 VITALS
RESPIRATION RATE: 16 BRPM | HEIGHT: 66 IN | BODY MASS INDEX: 21.86 KG/M2 | SYSTOLIC BLOOD PRESSURE: 104 MMHG | HEART RATE: 75 BPM | DIASTOLIC BLOOD PRESSURE: 75 MMHG | WEIGHT: 136 LBS

## 2018-03-07 PROCEDURE — 99214 OFFICE O/P EST MOD 30 MIN: CPT

## 2018-03-22 ENCOUNTER — CLINICAL ADVICE (OUTPATIENT)
Age: 48
End: 2018-03-22

## 2018-05-07 ENCOUNTER — RX RENEWAL (OUTPATIENT)
Age: 48
End: 2018-05-07

## 2018-08-07 ENCOUNTER — RX RENEWAL (OUTPATIENT)
Age: 48
End: 2018-08-07

## 2018-08-09 ENCOUNTER — OTHER (OUTPATIENT)
Age: 48
End: 2018-08-09

## 2018-09-10 ENCOUNTER — RX RENEWAL (OUTPATIENT)
Age: 48
End: 2018-09-10

## 2018-09-26 ENCOUNTER — APPOINTMENT (OUTPATIENT)
Dept: SPINE | Facility: CLINIC | Age: 48
End: 2018-09-26
Payer: OTHER MISCELLANEOUS

## 2018-09-26 VITALS
SYSTOLIC BLOOD PRESSURE: 108 MMHG | HEIGHT: 66 IN | HEART RATE: 75 BPM | BODY MASS INDEX: 21.86 KG/M2 | WEIGHT: 136 LBS | DIASTOLIC BLOOD PRESSURE: 71 MMHG

## 2018-09-26 PROCEDURE — 99214 OFFICE O/P EST MOD 30 MIN: CPT

## 2018-10-23 ENCOUNTER — RX RENEWAL (OUTPATIENT)
Age: 48
End: 2018-10-23

## 2019-01-08 PROBLEM — G43.909 MIGRAINE, UNSPECIFIED, NOT INTRACTABLE, WITHOUT STATUS MIGRAINOSUS: Chronic | Status: ACTIVE | Noted: 2017-09-01

## 2019-01-08 PROBLEM — E07.9 DISORDER OF THYROID, UNSPECIFIED: Chronic | Status: ACTIVE | Noted: 2017-09-01

## 2019-01-08 PROBLEM — C73 MALIGNANT NEOPLASM OF THYROID GLAND: Chronic | Status: ACTIVE | Noted: 2017-09-01

## 2019-03-19 NOTE — PHYSICAL THERAPY INITIAL EVALUATION ADULT - NAME OF DISCHARGE PLANNER
PLAN      Start zpak with food  Start benzonatate capsules twice daily  Use the albuterol inhaler twice daily  Drink lots of lfuids and rest   Alexia meier

## 2019-04-04 ENCOUNTER — OTHER (OUTPATIENT)
Age: 49
End: 2019-04-04

## 2019-04-19 ENCOUNTER — TRANSCRIPTION ENCOUNTER (OUTPATIENT)
Age: 49
End: 2019-04-19

## 2019-04-25 ENCOUNTER — TRANSCRIPTION ENCOUNTER (OUTPATIENT)
Age: 49
End: 2019-04-25

## 2019-05-01 ENCOUNTER — APPOINTMENT (OUTPATIENT)
Dept: SPINE | Facility: CLINIC | Age: 49
End: 2019-05-01
Payer: OTHER MISCELLANEOUS

## 2019-05-01 VITALS
SYSTOLIC BLOOD PRESSURE: 107 MMHG | WEIGHT: 132 LBS | DIASTOLIC BLOOD PRESSURE: 78 MMHG | BODY MASS INDEX: 21.21 KG/M2 | HEART RATE: 80 BPM | HEIGHT: 66 IN

## 2019-05-01 PROCEDURE — 99214 OFFICE O/P EST MOD 30 MIN: CPT

## 2019-05-01 RX ORDER — ALPRAZOLAM 1 MG/1
1 TABLET ORAL
Refills: 0 | Status: ACTIVE | COMMUNITY

## 2019-07-18 NOTE — PHYSICAL THERAPY INITIAL EVALUATION ADULT - REFERRING PHYSICIAN, REHAB EVAL
Raheem [No Acute Distress] : no acute distress [Well Nourished] : well nourished [Well Developed] : well developed [Well-Appearing] : well-appearing [Normal Sclera/Conjunctiva] : normal sclera/conjunctiva [PERRL] : pupils equal round and reactive to light [EOMI] : extraocular movements intact [Normal Outer Ear/Nose] : the outer ears and nose were normal in appearance [Normal Oropharynx] : the oropharynx was normal [No JVD] : no jugular venous distention [No Lymphadenopathy] : no lymphadenopathy [Supple] : supple [Thyroid Normal, No Nodules] : the thyroid was normal and there were no nodules present [No Respiratory Distress] : no respiratory distress  [No Accessory Muscle Use] : no accessory muscle use [Clear to Auscultation] : lungs were clear to auscultation bilaterally [Normal Rate] : normal rate  [Regular Rhythm] : with a regular rhythm [Normal S1, S2] : normal S1 and S2 [No Murmur] : no murmur heard [No Edema] : there was no peripheral edema [Soft] : abdomen soft [Non Tender] : non-tender [Non-distended] : non-distended [No Masses] : no abdominal mass palpated [No HSM] : no HSM [Normal Bowel Sounds] : normal bowel sounds [Normal Posterior Cervical Nodes] : no posterior cervical lymphadenopathy [Normal Anterior Cervical Nodes] : no anterior cervical lymphadenopathy [No CVA Tenderness] : no CVA  tenderness [No Spinal Tenderness] : no spinal tenderness [No Joint Swelling] : no joint swelling [Grossly Normal Strength/Tone] : grossly normal strength/tone [No Rash] : no rash [Coordination Grossly Intact] : coordination grossly intact [No Focal Deficits] : no focal deficits [Normal Gait] : normal gait [Deep Tendon Reflexes (DTR)] : deep tendon reflexes were 2+ and symmetric [Normal Affect] : the affect was normal [Normal Insight/Judgement] : insight and judgment were intact

## 2019-09-02 PROBLEM — Z09 FOLLOW UP: Status: ACTIVE | Noted: 2017-10-10

## 2019-10-10 ENCOUNTER — TRANSCRIPTION ENCOUNTER (OUTPATIENT)
Age: 49
End: 2019-10-10

## 2019-10-16 ENCOUNTER — APPOINTMENT (OUTPATIENT)
Dept: SPINE | Facility: CLINIC | Age: 49
End: 2019-10-16
Payer: OTHER MISCELLANEOUS

## 2019-10-16 VITALS
HEIGHT: 66 IN | WEIGHT: 132 LBS | DIASTOLIC BLOOD PRESSURE: 76 MMHG | BODY MASS INDEX: 21.21 KG/M2 | HEART RATE: 89 BPM | SYSTOLIC BLOOD PRESSURE: 112 MMHG

## 2019-10-16 PROCEDURE — 99212 OFFICE O/P EST SF 10 MIN: CPT

## 2019-10-30 ENCOUNTER — OTHER (OUTPATIENT)
Age: 49
End: 2019-10-30

## 2019-11-01 ENCOUNTER — OTHER (OUTPATIENT)
Age: 49
End: 2019-11-01

## 2019-11-07 ENCOUNTER — APPOINTMENT (OUTPATIENT)
Dept: ORTHOPEDIC SURGERY | Facility: CLINIC | Age: 49
End: 2019-11-07
Payer: OTHER MISCELLANEOUS

## 2019-11-07 VITALS
SYSTOLIC BLOOD PRESSURE: 117 MMHG | BODY MASS INDEX: 21.21 KG/M2 | WEIGHT: 132 LBS | DIASTOLIC BLOOD PRESSURE: 76 MMHG | HEIGHT: 66 IN | HEART RATE: 85 BPM

## 2019-11-07 PROCEDURE — 99203 OFFICE O/P NEW LOW 30 MIN: CPT

## 2019-11-07 PROCEDURE — 73502 X-RAY EXAM HIP UNI 2-3 VIEWS: CPT
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
## 2019-11-07 NOTE — ADDENDUM
[FreeTextEntry1] : This note was authored by Nathan Rosa working as a medical scribe for Dr. Kam Taylor. The note was reviewed, edited, and revised by Dr. Kam Taylor whom is in agreement with the exam findings, imaging findings, and treatment plan. 11/07/2019.

## 2019-11-07 NOTE — DISCUSSION/SUMMARY
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  She has failed extensive nonoperative treatment with multiple cortisone injections.  MRI shows more arthritis than is indicated on the x-ray.  I recommended an evaluation with Dr. Mcknight to determine whether she would benefit from hip arthroscopy as I do think it would be premature to consider hip replacement.  If it is determined that she has too much arthritis to benefit from arthroscopy we can continue to manage her nonoperatively and in the future reconsider hip replacement when her symptoms and imaging becomes more advanced.

## 2019-11-07 NOTE — CONSULT LETTER
[Dear  ___] : Dear  [unfilled], [Please see my note below.] : Please see my note below. [Consult Closing:] : Thank you very much for allowing me to participate in the care of this patient.  If you have any questions, please do not hesitate to contact me. [Consult Letter:] : I had the pleasure of evaluating your patient, [unfilled]. [Sincerely,] : Sincerely, [FreeTextEntry3] : Kam Taylor MD\par Chief of Joint Replacement\par Primary & Revision Hip and Knee Replacement\par [facility]\par  [FreeTextEntry2] : ANNI WILEY MD\par

## 2019-11-07 NOTE — REASON FOR VISIT
[Initial Visit] : an initial visit for [Other: ____] : [unfilled] [Worker's Compensation] : this visit is related to worker's compensation

## 2019-11-07 NOTE — PHYSICAL EXAM
[de-identified] : Exam of the right hip shows difficulty and groin pain with SLR, groin pain with hip flexion and internal rotation, buttock pain with external rotation, tenderness with palpation of the trochanteric bursa. 5/5 motor strength bilaterally distally. Sensation intact distally.  [de-identified] : The patient appears well nourished  and in no apparent distress.  The patient is alert and oriented to person, place, and time.   Affect and mood appear normal. The head is normocephalic and atraumatic.  The eyes reveal normal sclera and extra ocular muscles are intact. The tongue is midline with no apparent lesions.  Skin shows normal turgor with no evidence of eczema or psoriasis.  No respiratory distress noted.  Sensation grossly intact.\par   [de-identified] : Xray- AP pelvis and 2 views right hip shows mild arthritis of the right hip.\par \par MRI - performed at [facility] on 8/16/2019 of the right hip- \par 1. Nondisplaced tear of the anterior superior posterior superior labrum.\par 2. Mild osteoarthritis in the right hip joint.\par \par

## 2019-12-03 ENCOUNTER — APPOINTMENT (OUTPATIENT)
Age: 49
End: 2019-12-03
Payer: OTHER MISCELLANEOUS

## 2019-12-03 PROCEDURE — 99213 OFFICE O/P EST LOW 20 MIN: CPT

## 2019-12-03 NOTE — DISCUSSION/SUMMARY
[de-identified] : Mckenna is a 49-year-old female comes in complaining of right hip and groin pain.  After review of her MRI and my physical goal exam it appears she has right hip impingement, femoral acetabular impingement, and a labral tear.  Today had a thorough discussion with her regarding the nature of her symptoms as well as all treatment options.  We discussed the anatomy as well as the pathophysiology surrounding her injury.  At this time given her symptoms I would like to review the old medical records of her pain management specialist who has done injections in the area and around the hip.  This will provide me with some information regarding improvements with injections and if that will lead to good prognosis for surgical intervention.  Although the MRI does show mild arthritis joint spaces are well-preserved and there are no signs of osteophyte formation.  Therefore she will have a possibly more successful result with hip arthroscopy versus some but he does have some arthritis in the hip.  I will see her back with the medical records to discuss treatment options.  I will call her once I receive the medical records.  She agrees with the above plan and all questions were answered.

## 2019-12-03 NOTE — HISTORY OF PRESENT ILLNESS
[de-identified] : Zulay Pan is an 49 y.o. female present with right hip pain. Pt states that she has been having pain for 2 years after being involved in [employer] her hip pain is localized to the groin.  Is worse with sitting for long periods time as well as walking.  She has some difficulty going up and down stairs.  She has tried multiple cortisone injections and has tried physical therapy and is been completely refractory.

## 2019-12-03 NOTE — PHYSICAL EXAM
[de-identified] : Physical Exam:\par General: Well appearing, no acute distress, A&O\par Neurologic: A&Ox3, No focal deficits\par Head: NCAT without abrasions, lacerations, or ecchymosis to head, face, or scalp\par Eyes: No scleral icterus, no gross abnormalities\par Respiratory: Equal chest wall expansion bilaterally, no accessory muscle use\par Lymphatic: No lymphadenopathy palpated\par Skin: Warm and dry\par Psychiatric: Normal mood and affect\par On inspection of the right hip shows no gross abnormalities. No loss muscle volume. Skin appears normal. When asked to point at the most painful part of their hip, they make a C sign\par Negative Heel strike, negative log roll. \par At 90° of flexion internal rotation is limited to 5°. Extremely painful in the groin. External rotation is limited to 30°. No pain. Hip flexor strength is 4-5 because of pain.  Anterior hip impingement signs are positive. No evidence of posterior impingement. \par Resisted straight leg raise does not produce groin pain. No signs of the iliopsoas tendinitis\par No audible or palpable clicking. \par On lateral decubitus examination there is no focal area of her greater trochanteric tenderness. Abductor strength is 5 out of 5.\par \par Motor strength is intact distally, 5/5 over quads,hamstring, EHL, FHL, GSC, TA.\par Sensation intact over L1-S1 dermatomes\par DP 2+ [de-identified] : X-rays were reviewed from a prior visit.  They demonstrate no signs of cam lesion.  No signs of pincer lesion.  When spaces were met measured and were found to be greater than 5 mm.  No signs of osteophyte formation.\par \par An MRI was also performed showing a tear of the anterosuperior labrum with very mild arthritis.  Joint spaces were greater than 5 mm throughout.

## 2019-12-04 ENCOUNTER — APPOINTMENT (OUTPATIENT)
Dept: SPINE | Facility: CLINIC | Age: 49
End: 2019-12-04
Payer: OTHER MISCELLANEOUS

## 2019-12-04 VITALS
RESPIRATION RATE: 16 BRPM | BODY MASS INDEX: 21.21 KG/M2 | DIASTOLIC BLOOD PRESSURE: 61 MMHG | HEART RATE: 81 BPM | HEIGHT: 66 IN | SYSTOLIC BLOOD PRESSURE: 92 MMHG | OXYGEN SATURATION: 96 % | WEIGHT: 132 LBS

## 2019-12-04 PROCEDURE — 99213 OFFICE O/P EST LOW 20 MIN: CPT

## 2019-12-06 ENCOUNTER — MOBILE ON CALL (OUTPATIENT)
Age: 49
End: 2019-12-06

## 2019-12-19 ENCOUNTER — APPOINTMENT (OUTPATIENT)
Dept: ORTHOPEDIC SURGERY | Facility: CLINIC | Age: 49
End: 2019-12-19

## 2020-01-06 ENCOUNTER — FORM ENCOUNTER (OUTPATIENT)
Age: 50
End: 2020-01-06

## 2020-01-07 ENCOUNTER — OUTPATIENT (OUTPATIENT)
Dept: OUTPATIENT SERVICES | Facility: HOSPITAL | Age: 50
LOS: 1 days | End: 2020-01-07
Payer: COMMERCIAL

## 2020-01-07 ENCOUNTER — APPOINTMENT (OUTPATIENT)
Dept: ULTRASOUND IMAGING | Facility: CLINIC | Age: 50
End: 2020-01-07
Payer: OTHER MISCELLANEOUS

## 2020-01-07 DIAGNOSIS — Z90.89 ACQUIRED ABSENCE OF OTHER ORGANS: Chronic | ICD-10-CM

## 2020-01-07 DIAGNOSIS — Z98.890 OTHER SPECIFIED POSTPROCEDURAL STATES: Chronic | ICD-10-CM

## 2020-01-07 DIAGNOSIS — Z98.1 ARTHRODESIS STATUS: Chronic | ICD-10-CM

## 2020-01-07 DIAGNOSIS — Z87.59 PERSONAL HISTORY OF OTHER COMPLICATIONS OF PREGNANCY, CHILDBIRTH AND THE PUERPERIUM: Chronic | ICD-10-CM

## 2020-01-07 DIAGNOSIS — E89.0 POSTPROCEDURAL HYPOTHYROIDISM: Chronic | ICD-10-CM

## 2020-01-07 DIAGNOSIS — M25.551 PAIN IN RIGHT HIP: ICD-10-CM

## 2020-01-07 DIAGNOSIS — Z98.82 BREAST IMPLANT STATUS: Chronic | ICD-10-CM

## 2020-01-07 DIAGNOSIS — Z09 ENCOUNTER FOR FOLLOW-UP EXAMINATION AFTER COMPLETED TREATMENT FOR CONDITIONS OTHER THAN MALIGNANT NEOPLASM: Chronic | ICD-10-CM

## 2020-01-07 PROCEDURE — 20611 DRAIN/INJ JOINT/BURSA W/US: CPT | Mod: RT

## 2020-01-07 PROCEDURE — 20611 DRAIN/INJ JOINT/BURSA W/US: CPT

## 2020-03-02 ENCOUNTER — APPOINTMENT (OUTPATIENT)
Dept: ORTHOPEDIC SURGERY | Facility: CLINIC | Age: 50
End: 2020-03-02
Payer: OTHER MISCELLANEOUS

## 2020-03-02 PROCEDURE — 99213 OFFICE O/P EST LOW 20 MIN: CPT

## 2020-03-02 NOTE — PHYSICAL EXAM
[de-identified] : Physical Exam:\par General: Well appearing, no acute distress, A&O\par Neurologic: A&Ox3, No focal deficits\par Head: NCAT without abrasions, lacerations, or ecchymosis to head, face, or scalp\par Eyes: No scleral icterus, no gross abnormalities\par Respiratory: Equal chest wall expansion bilaterally, no accessory muscle use\par Lymphatic: No lymphadenopathy palpated\par Skin: Warm and dry\par Psychiatric: Normal mood and affect\par On inspection of the right hip shows no gross abnormalities. No loss muscle volume. Skin appears normal. When asked to point at the most painful part of their hip, they make a C sign\par Negative Heel strike, negative log roll. \par At 90° of flexion internal rotation is limited to 5°. Extremely painful in the groin. External rotation is limited to 30°. No pain. Hip flexor strength is 4-5 because of pain.  Anterior hip impingement signs are positive. No evidence of posterior impingement. \par Resisted straight leg raise does not produce groin pain. No signs of the iliopsoas tendinitis\par No audible or palpable clicking. \par On lateral decubitus examination there is no focal area of her greater trochanteric tenderness. Abductor strength is 5 out of 5.\par \par Motor strength is intact distally, 5/5 over quads,hamstring, EHL, FHL, GSC, TA.\par Sensation intact over L1-S1 dermatomes\par DP 2+ [de-identified] : X-rays were reviewed from a prior visit.  They demonstrate no signs of cam lesion.  No signs of pincer lesion.  When spaces were met measured and were found to be greater than 5 mm.  No signs of osteophyte formation.\par \par An MRI was also performed showing a tear of the anterosuperior labrum with very mild arthritis.  Joint spaces were greater than 5 mm throughout.

## 2020-03-02 NOTE — DISCUSSION/SUMMARY
[de-identified] : Mckenna is a 49-year-old female comes in complaining of right hip and groin pain.  After review of her MRI and my physical exam it appears she has right hip impingement, femoral acetabular impingement, and a labral tear.  Today had a thorough discussion with her regarding the nature of her symptoms as well as all treatment options.  We discussed the anatomy as well as the pathophysiology surrounding her injury.  At this time given her symptoms and resolution with lidocaine injection test confirming her hip pain to be from her labral tear, and an extensive history of failing on conservative measures including PT and multiple cortisone injections alongside oral NSAIDs, surgical intervention is indicated.  MRI does show mild arthritis but the  joint spaces are well-preserved and there are no signs of osteophyte formation.  Therefore she will have a possibly more successful result with hip arthroscopy versus some but she was not given any assurances that this procedure will full alleviate her symptoms. Pt still elects for hip arthroscopy at this time. I had my surgical coordinator come and meet with her to go over dates. PAll the risks and benefits of this procedure were discussed with the patient. Risks include, but are not limited to, infection, bleeding, dislocation, nerve injury, blood clots and other possible medical complications, which were discussed with the patient. Also the risks of possible readmission were discussed with the patient. Patient completely understands all risks and benefits. The need for postoperative DVT prophylaxis discussed with the patient as well. The patient was given no assurances that all the symptoms will be alleviated. Patient completely understands all this. She has been advised to get medical clearance before the procedure, in order to decrease complication risk. She agrees with the above plan and all questions were answered.\par

## 2020-03-02 NOTE — REASON FOR VISIT
[Follow-Up Visit] : a follow-up visit for [Worker's Compensation] : This visit is related to worker's compensation [FreeTextEntry2] : right hip pain.

## 2020-03-02 NOTE — HISTORY OF PRESENT ILLNESS
[de-identified] : Zluay Pan is an 50 y.o. female present with right hip pain. Pt states that she has been having pain for 2 years after being involved in [employer]. her hip pain is localized to the groin.  Is worse with sitting for long periods time as well as walking.  She has some difficulty going up and down stairs.  She has tried multiple cortisone injections and has tried physical therapy and is been completely refractory.\par \par Patient reports short term relief with the diagnostic lidocaine injection test to her hip. She forgot her pain scale form at home. She admits to about 5 hours of relief with 0/10 pain and then 10/10 back again therafter.  Her symptoms are unchanged at this point.

## 2020-04-13 ENCOUNTER — APPOINTMENT (OUTPATIENT)
Dept: SPINE | Facility: CLINIC | Age: 50
End: 2020-04-13
Payer: OTHER MISCELLANEOUS

## 2020-04-13 PROCEDURE — 99212 OFFICE O/P EST SF 10 MIN: CPT | Mod: 95

## 2020-04-13 NOTE — REASON FOR VISIT
[Follow-Up: _____] : a [unfilled] follow-up visit [FreeTextEntry1] : The patient is being seen via telehealth due to the COVID 19 pandemic.

## 2020-04-13 NOTE — HISTORY OF PRESENT ILLNESS
[Home] : at home, [unfilled] , at the time of the visit. [Medical Office: ([facility])___] : at ~his/her~ medical office located in [place] [Patient] : the patient [Self] : self [FreeTextEntry2] : Mckenna Pan [FreeTextEntry4] : ALONZO Murphy [FreeTextEntry1] : The patient continues with bilateral neck and shoulder pain and stiffness.  She has been unable to do physical therapy in at least 3 weeks due to the Corona Virus pandemic.  The patient received Botox injections in March which has helped with her headaches.  She continues with low back and bilateral hip pain.  She was seen by an orthopedist who recommended arthroscopic surgery to her right hip for a torn labrum.  The patient also received bilateral hip injections from Dr. Mina Tsai.

## 2020-04-13 NOTE — PHYSICAL EXAM
[FreeTextEntry1] : The medical exam is limited due to this being a telemedicine appointment.  The patient is Alert and oriented x 3.  Higher cortical functions are intact.

## 2020-04-13 NOTE — ASSESSMENT
[FreeTextEntry1] : It was recommended that the patient do physical therapy to help relieve neck, shoulder, back and leg pain.  She may return to the office on a prn basis.

## 2020-07-16 ENCOUNTER — NON-APPOINTMENT (OUTPATIENT)
Age: 50
End: 2020-07-16

## 2020-08-10 ENCOUNTER — APPOINTMENT (OUTPATIENT)
Dept: ORTHOPEDIC SURGERY | Facility: CLINIC | Age: 50
End: 2020-08-10
Payer: OTHER MISCELLANEOUS

## 2020-08-10 PROCEDURE — 99214 OFFICE O/P EST MOD 30 MIN: CPT

## 2020-08-13 NOTE — PHYSICAL EXAM
[de-identified] : Physical Exam:\par General: Well appearing, no acute distress, A&O\par Neurologic: A&Ox3, No focal deficits\par Head: NCAT without abrasions, lacerations, or ecchymosis to head, face, or scalp\par Eyes: No scleral icterus, no gross abnormalities\par Respiratory: Equal chest wall expansion bilaterally, no accessory muscle use\par Lymphatic: No lymphadenopathy palpated\par Skin: Warm and dry\par Psychiatric: Normal mood and affect\par On inspection of the right hip shows no gross abnormalities. No loss muscle volume. Skin appears normal. When asked to point at the most painful part of their hip, they make a C sign\par Negative Heel strike, negative log roll. \par At 90° of flexion internal rotation is limited to 5°. Extremely painful in the groin. External rotation is limited to 30°. No pain. Hip flexor strength is 4-5 because of pain.  Anterior hip impingement signs are positive. No evidence of posterior impingement. \par Resisted straight leg raise does not produce groin pain. No signs of the iliopsoas tendinitis\par No audible or palpable clicking. \par On lateral decubitus examination there is no focal area of her greater trochanteric tenderness. Abductor strength is 5 out of 5.\par \par Motor strength is intact distally, 5/5 over quads,hamstring, EHL, FHL, GSC, TA.\par Sensation intact over L1-S1 dermatomes\par DP 2+ [de-identified] : X-rays were reviewed from a prior visit.  They demonstrate no signs of cam lesion.  No signs of pincer lesion.  When spaces were met measured and were found to be greater than 5 mm.  No signs of osteophyte formation.\par \par An MRI was also performed showing a tear of the anterosuperior labrum with very mild arthritis.  Joint spaces were greater than 5 mm throughout.

## 2020-08-13 NOTE — HISTORY OF PRESENT ILLNESS
[de-identified] : Zulay Pan is an 50 y.o. female present with right hip pain. Pt states that she has been having pain for 2 years after being involved in [employer] her hip pain is localized to the groin.  Is worse with sitting for long periods time as well as walking.  She has some difficulty going up and down stairs.  She has tried multiple cortisone injections and has tried physical therapy and is been completely refractory. She has had multiple presurgical consults at this point. Here today to discuss surgical treatment.\par \par Patient reports short term relief with the diagnostic lidocaine injection test to her hip. She forgot her pain scale form at home. She admits to about 5 hours of relief with 0/10 pain and then 10/10 back again therafter.  Her symptoms are unchanged at this point.

## 2020-08-13 NOTE — DISCUSSION/SUMMARY
[de-identified] : Mckenna is a 50-year-old female comes in complaining of right hip and groin pain.  After review of her MRI and my physical exam it appears she has right hip impingement, femoral acetabular impingement, and a labral tear.  Today had a thorough discussion with her regarding the nature of her symptoms as well as all treatment options.  We discussed the anatomy as well as the pathophysiology surrounding her injury.  At this time given her symptoms and resolution with lidocaine injection test confirming her hip pain to be from her labral tear, and an extensive history of failing on conservative measures including PT and multiple cortisone injections alongside oral NSAIDs, surgical intervention is indicated.  MRI does show mild arthritis but the  joint spaces are well-preserved and there are no signs of osteophyte formation.  Therefore she will have a possibly more successful result with hip arthroscopy versus some but she was not given any assurances that this procedure will full alleviate her symptoms. Pt still elects for hip arthroscopy at this time. I had my surgical coordinator come and meet with her to go over dates. Patient understands the risks and benefits of this procedure were discussed with the patient. Risks include, but are not limited to, infection, bleeding, dislocation, nerve injury, blood clots and other possible medical complications, which were discussed with the patient. Also the risks of possible readmission were discussed with the patient. Patient completely understands all risks and benefits. The need for postoperative DVT prophylaxis discussed with the patient as well. The patient was given no assurances that all the symptoms will be alleviated. Patient completely understands all this. She has been advised to get medical clearance before the procedure, in order to decrease complication risk. She agrees with the above plan and all questions were answered.\par

## 2020-09-18 ENCOUNTER — OUTPATIENT (OUTPATIENT)
Dept: OUTPATIENT SERVICES | Facility: HOSPITAL | Age: 50
LOS: 1 days | End: 2020-09-18
Payer: COMMERCIAL

## 2020-09-18 VITALS
WEIGHT: 137.35 LBS | OXYGEN SATURATION: 99 % | HEIGHT: 66 IN | SYSTOLIC BLOOD PRESSURE: 112 MMHG | RESPIRATION RATE: 16 BRPM | TEMPERATURE: 97 F | DIASTOLIC BLOOD PRESSURE: 64 MMHG | HEART RATE: 74 BPM

## 2020-09-18 DIAGNOSIS — Z90.79 ACQUIRED ABSENCE OF OTHER GENITAL ORGAN(S): Chronic | ICD-10-CM

## 2020-09-18 DIAGNOSIS — Z87.59 PERSONAL HISTORY OF OTHER COMPLICATIONS OF PREGNANCY, CHILDBIRTH AND THE PUERPERIUM: Chronic | ICD-10-CM

## 2020-09-18 DIAGNOSIS — M25.851 OTHER SPECIFIED JOINT DISORDERS, RIGHT HIP: ICD-10-CM

## 2020-09-18 DIAGNOSIS — E89.0 POSTPROCEDURAL HYPOTHYROIDISM: Chronic | ICD-10-CM

## 2020-09-18 DIAGNOSIS — S73.191A OTHER SPRAIN OF RIGHT HIP, INITIAL ENCOUNTER: ICD-10-CM

## 2020-09-18 DIAGNOSIS — Z98.1 ARTHRODESIS STATUS: Chronic | ICD-10-CM

## 2020-09-18 DIAGNOSIS — Z98.890 OTHER SPECIFIED POSTPROCEDURAL STATES: Chronic | ICD-10-CM

## 2020-09-18 DIAGNOSIS — Z09 ENCOUNTER FOR FOLLOW-UP EXAMINATION AFTER COMPLETED TREATMENT FOR CONDITIONS OTHER THAN MALIGNANT NEOPLASM: Chronic | ICD-10-CM

## 2020-09-18 DIAGNOSIS — Z98.82 BREAST IMPLANT STATUS: Chronic | ICD-10-CM

## 2020-09-18 DIAGNOSIS — Z90.89 ACQUIRED ABSENCE OF OTHER ORGANS: Chronic | ICD-10-CM

## 2020-09-18 LAB
ANION GAP SERPL CALC-SCNC: 1 MMOL/L — LOW (ref 5–17)
APPEARANCE UR: CLEAR — SIGNIFICANT CHANGE UP
APTT BLD: 32.9 SEC — SIGNIFICANT CHANGE UP (ref 27.5–35.5)
BASOPHILS # BLD AUTO: 0.06 K/UL — SIGNIFICANT CHANGE UP (ref 0–0.2)
BASOPHILS NFR BLD AUTO: 0.5 % — SIGNIFICANT CHANGE UP (ref 0–2)
BILIRUB UR-MCNC: NEGATIVE — SIGNIFICANT CHANGE UP
BUN SERPL-MCNC: 17 MG/DL — SIGNIFICANT CHANGE UP (ref 7–23)
CALCIUM SERPL-MCNC: 9.6 MG/DL — SIGNIFICANT CHANGE UP (ref 8.5–10.1)
CHLORIDE SERPL-SCNC: 111 MMOL/L — HIGH (ref 96–108)
CO2 SERPL-SCNC: 29 MMOL/L — SIGNIFICANT CHANGE UP (ref 22–31)
COLOR SPEC: YELLOW — SIGNIFICANT CHANGE UP
CREAT SERPL-MCNC: 0.71 MG/DL — SIGNIFICANT CHANGE UP (ref 0.5–1.3)
DIFF PNL FLD: ABNORMAL
EOSINOPHIL # BLD AUTO: 0.33 K/UL — SIGNIFICANT CHANGE UP (ref 0–0.5)
EOSINOPHIL NFR BLD AUTO: 2.9 % — SIGNIFICANT CHANGE UP (ref 0–6)
GLUCOSE SERPL-MCNC: 104 MG/DL — HIGH (ref 70–99)
GLUCOSE UR QL: NEGATIVE MG/DL — SIGNIFICANT CHANGE UP
HCT VFR BLD CALC: 47.6 % — HIGH (ref 34.5–45)
HGB BLD-MCNC: 15.4 G/DL — SIGNIFICANT CHANGE UP (ref 11.5–15.5)
IMM GRANULOCYTES NFR BLD AUTO: 0.3 % — SIGNIFICANT CHANGE UP (ref 0–1.5)
INR BLD: 1.04 RATIO — SIGNIFICANT CHANGE UP (ref 0.88–1.16)
KETONES UR-MCNC: ABNORMAL
LEUKOCYTE ESTERASE UR-ACNC: ABNORMAL
LYMPHOCYTES # BLD AUTO: 3.76 K/UL — HIGH (ref 1–3.3)
LYMPHOCYTES # BLD AUTO: 32.7 % — SIGNIFICANT CHANGE UP (ref 13–44)
MCHC RBC-ENTMCNC: 30.5 PG — SIGNIFICANT CHANGE UP (ref 27–34)
MCHC RBC-ENTMCNC: 32.4 GM/DL — SIGNIFICANT CHANGE UP (ref 32–36)
MCV RBC AUTO: 94.3 FL — SIGNIFICANT CHANGE UP (ref 80–100)
MONOCYTES # BLD AUTO: 0.49 K/UL — SIGNIFICANT CHANGE UP (ref 0–0.9)
MONOCYTES NFR BLD AUTO: 4.3 % — SIGNIFICANT CHANGE UP (ref 2–14)
NEUTROPHILS # BLD AUTO: 6.84 K/UL — SIGNIFICANT CHANGE UP (ref 1.8–7.4)
NEUTROPHILS NFR BLD AUTO: 59.3 % — SIGNIFICANT CHANGE UP (ref 43–77)
NITRITE UR-MCNC: NEGATIVE — SIGNIFICANT CHANGE UP
PH UR: 5 — SIGNIFICANT CHANGE UP (ref 5–8)
PLATELET # BLD AUTO: 253 K/UL — SIGNIFICANT CHANGE UP (ref 150–400)
POTASSIUM SERPL-MCNC: 4.6 MMOL/L — SIGNIFICANT CHANGE UP (ref 3.5–5.3)
POTASSIUM SERPL-SCNC: 4.6 MMOL/L — SIGNIFICANT CHANGE UP (ref 3.5–5.3)
PROT UR-MCNC: 15 MG/DL
PROTHROM AB SERPL-ACNC: 12.1 SEC — SIGNIFICANT CHANGE UP (ref 10.6–13.6)
RBC # BLD: 5.05 M/UL — SIGNIFICANT CHANGE UP (ref 3.8–5.2)
RBC # FLD: 13 % — SIGNIFICANT CHANGE UP (ref 10.3–14.5)
SODIUM SERPL-SCNC: 141 MMOL/L — SIGNIFICANT CHANGE UP (ref 135–145)
SP GR SPEC: 1.02 — SIGNIFICANT CHANGE UP (ref 1.01–1.02)
UROBILINOGEN FLD QL: NEGATIVE MG/DL — SIGNIFICANT CHANGE UP
WBC # BLD: 11.51 K/UL — HIGH (ref 3.8–10.5)
WBC # FLD AUTO: 11.51 K/UL — HIGH (ref 3.8–10.5)

## 2020-09-18 PROCEDURE — 93005 ELECTROCARDIOGRAM TRACING: CPT

## 2020-09-18 PROCEDURE — 85610 PROTHROMBIN TIME: CPT

## 2020-09-18 PROCEDURE — G0463: CPT | Mod: 25

## 2020-09-18 PROCEDURE — 85730 THROMBOPLASTIN TIME PARTIAL: CPT

## 2020-09-18 PROCEDURE — 80048 BASIC METABOLIC PNL TOTAL CA: CPT

## 2020-09-18 PROCEDURE — 93010 ELECTROCARDIOGRAM REPORT: CPT

## 2020-09-18 PROCEDURE — 85025 COMPLETE CBC W/AUTO DIFF WBC: CPT

## 2020-09-18 PROCEDURE — 81001 URINALYSIS AUTO W/SCOPE: CPT

## 2020-09-18 PROCEDURE — 36415 COLL VENOUS BLD VENIPUNCTURE: CPT
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
## 2020-09-18 NOTE — H&P PST ADULT - ASSESSMENT
This is a 49 y/o female with Right hip pain who is scheduled for a Right hip arthroscopy with femoroplasty, acetabuloplasty and labral repair   Patient instructed on     1. NPO post midnight of surgery  2. On the use of EZ sponges  3. Reports she was told she needed medical clearance (has an appointment with Dr. Hare on 9/24/2020 @9:30 AM)  4. May take Synthroid with a sip of water on morning of procedure   5. Aware she has COVID 19 testing on September 22, at 3 PM This is a 49 y/o female with Right hip pain who is scheduled for a Right hip arthroscopy with femoroplasty, acetabuloplasty and labral repair   Patient instructed on     1. NPO post midnight of surgery  2. On the use of EZ sponges  3. Reports she was told she needed medical clearance (has an appointment with Dr. Hare on 9/24/2020 @9:30 AM)   4. May take Synthroid with a sip of water on morning of procedure   5. Aware she has COVID 19 testing on September 22, at 3 PM  6. Aware she needs a ride home

## 2020-09-18 NOTE — H&P PST ADULT - NSICDXPASTMEDICALHX_GEN_ALL_CORE_FT
PAST MEDICAL HISTORY:  Chronic neck pain due to 3 fusions    Migraine     Thyroid cancer age 31 tx radiation    Thyroid disease

## 2020-09-18 NOTE — H&P PST ADULT - NSICDXPASTSURGICALHX_GEN_ALL_CORE_FT
PAST SURGICAL HISTORY:  H/O laparoscopy     H/O salpingostomy Right    S/P breast augmentation saline    S/P carpal tunnel release B/L wrist    S/P cervical spinal fusion x3 and 4 levels are fused    S/P ectopic pregnancy X 2    S/P thyroidectomy 3 seperate surgeries    S/P tonsillectomy     S/P umbilical hernia repair, follow-up exam      PAST SURGICAL HISTORY:  H/O laparoscopy     History of unilateral salpingectomy Right    S/P breast augmentation saline    S/P carpal tunnel release B/L wrist    S/P cervical spinal fusion x3 and 4 levels are fused    S/P ectopic pregnancy X 2    S/P thyroidectomy 3 seperate surgeries    S/P tonsillectomy     S/P umbilical hernia repair, follow-up exam

## 2020-09-18 NOTE — H&P PST ADULT - NSICDXFAMILYHX_GEN_ALL_CORE_FT
FAMILY HISTORY:  FH: lymphoma, brother who is alive as of 9/2020  FH: pancreatic cancer, father passed away at age 64  FH: prostate cancer, father passed away at age 64  FH: prostate cancer, brother who is alive and well as of 9/2020. He also has lymphoma

## 2020-09-19 DIAGNOSIS — S73.191A OTHER SPRAIN OF RIGHT HIP, INITIAL ENCOUNTER: ICD-10-CM

## 2020-09-19 DIAGNOSIS — M25.851 OTHER SPECIFIED JOINT DISORDERS, RIGHT HIP: ICD-10-CM

## 2020-09-22 ENCOUNTER — OUTPATIENT (OUTPATIENT)
Dept: OUTPATIENT SERVICES | Facility: HOSPITAL | Age: 50
LOS: 1 days | End: 2020-09-22
Payer: COMMERCIAL

## 2020-09-22 DIAGNOSIS — Z98.890 OTHER SPECIFIED POSTPROCEDURAL STATES: Chronic | ICD-10-CM

## 2020-09-22 DIAGNOSIS — Z98.1 ARTHRODESIS STATUS: Chronic | ICD-10-CM

## 2020-09-22 DIAGNOSIS — Z90.79 ACQUIRED ABSENCE OF OTHER GENITAL ORGAN(S): Chronic | ICD-10-CM

## 2020-09-22 DIAGNOSIS — Z87.59 PERSONAL HISTORY OF OTHER COMPLICATIONS OF PREGNANCY, CHILDBIRTH AND THE PUERPERIUM: Chronic | ICD-10-CM

## 2020-09-22 DIAGNOSIS — E89.0 POSTPROCEDURAL HYPOTHYROIDISM: Chronic | ICD-10-CM

## 2020-09-22 DIAGNOSIS — Z11.59 ENCOUNTER FOR SCREENING FOR OTHER VIRAL DISEASES: ICD-10-CM

## 2020-09-22 DIAGNOSIS — Z98.82 BREAST IMPLANT STATUS: Chronic | ICD-10-CM

## 2020-09-22 DIAGNOSIS — Z09 ENCOUNTER FOR FOLLOW-UP EXAMINATION AFTER COMPLETED TREATMENT FOR CONDITIONS OTHER THAN MALIGNANT NEOPLASM: Chronic | ICD-10-CM

## 2020-09-22 DIAGNOSIS — Z90.89 ACQUIRED ABSENCE OF OTHER ORGANS: Chronic | ICD-10-CM

## 2020-09-22 PROCEDURE — U0003: CPT

## 2020-09-23 DIAGNOSIS — Z11.59 ENCOUNTER FOR SCREENING FOR OTHER VIRAL DISEASES: ICD-10-CM

## 2020-09-23 LAB — SARS-COV-2 RNA SPEC QL NAA+PROBE: SIGNIFICANT CHANGE UP

## 2020-09-25 ENCOUNTER — RESULT REVIEW (OUTPATIENT)
Age: 50
End: 2020-09-25

## 2020-09-25 ENCOUNTER — OUTPATIENT (OUTPATIENT)
Dept: INPATIENT UNIT | Facility: HOSPITAL | Age: 50
LOS: 1 days | Discharge: HOME | End: 2020-09-25
Payer: COMMERCIAL

## 2020-09-25 ENCOUNTER — APPOINTMENT (OUTPATIENT)
Dept: ORTHOPEDIC SURGERY | Facility: HOSPITAL | Age: 50
End: 2020-09-25

## 2020-09-25 VITALS
TEMPERATURE: 98 F | OXYGEN SATURATION: 99 % | DIASTOLIC BLOOD PRESSURE: 68 MMHG | HEIGHT: 66 IN | WEIGHT: 137.35 LBS | HEART RATE: 77 BPM | SYSTOLIC BLOOD PRESSURE: 104 MMHG | RESPIRATION RATE: 18 BRPM

## 2020-09-25 VITALS
OXYGEN SATURATION: 97 % | TEMPERATURE: 98 F | SYSTOLIC BLOOD PRESSURE: 110 MMHG | RESPIRATION RATE: 16 BRPM | HEART RATE: 87 BPM | DIASTOLIC BLOOD PRESSURE: 71 MMHG
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
## 2020-09-25 DIAGNOSIS — F17.210 NICOTINE DEPENDENCE, CIGARETTES, UNCOMPLICATED: ICD-10-CM

## 2020-09-25 DIAGNOSIS — S73.191A OTHER SPRAIN OF RIGHT HIP, INITIAL ENCOUNTER: ICD-10-CM

## 2020-09-25 DIAGNOSIS — Z87.59 PERSONAL HISTORY OF OTHER COMPLICATIONS OF PREGNANCY, CHILDBIRTH AND THE PUERPERIUM: Chronic | ICD-10-CM

## 2020-09-25 DIAGNOSIS — Z90.79 ACQUIRED ABSENCE OF OTHER GENITAL ORGAN(S): ICD-10-CM

## 2020-09-25 DIAGNOSIS — E89.0 POSTPROCEDURAL HYPOTHYROIDISM: Chronic | ICD-10-CM

## 2020-09-25 DIAGNOSIS — M65.9 SYNOVITIS AND TENOSYNOVITIS, UNSPECIFIED: ICD-10-CM

## 2020-09-25 DIAGNOSIS — Z09 ENCOUNTER FOR FOLLOW-UP EXAMINATION AFTER COMPLETED TREATMENT FOR CONDITIONS OTHER THAN MALIGNANT NEOPLASM: Chronic | ICD-10-CM

## 2020-09-25 DIAGNOSIS — E78.5 HYPERLIPIDEMIA, UNSPECIFIED: ICD-10-CM

## 2020-09-25 DIAGNOSIS — J44.9 CHRONIC OBSTRUCTIVE PULMONARY DISEASE, UNSPECIFIED: ICD-10-CM

## 2020-09-25 DIAGNOSIS — Z92.3 PERSONAL HISTORY OF IRRADIATION: ICD-10-CM

## 2020-09-25 DIAGNOSIS — Z98.890 OTHER SPECIFIED POSTPROCEDURAL STATES: Chronic | ICD-10-CM

## 2020-09-25 DIAGNOSIS — M24.851 OTHER SPECIFIC JOINT DERANGEMENTS OF RIGHT HIP, NOT ELSEWHERE CLASSIFIED: ICD-10-CM

## 2020-09-25 DIAGNOSIS — Z85.850 PERSONAL HISTORY OF MALIGNANT NEOPLASM OF THYROID: ICD-10-CM

## 2020-09-25 DIAGNOSIS — M25.851 OTHER SPECIFIED JOINT DISORDERS, RIGHT HIP: ICD-10-CM
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
## 2020-09-25 DIAGNOSIS — M93.851: ICD-10-CM

## 2020-09-25 DIAGNOSIS — Z98.1 ARTHRODESIS STATUS: Chronic | ICD-10-CM

## 2020-09-25 DIAGNOSIS — Z90.79 ACQUIRED ABSENCE OF OTHER GENITAL ORGAN(S): Chronic | ICD-10-CM

## 2020-09-25 DIAGNOSIS — Z90.89 ACQUIRED ABSENCE OF OTHER ORGANS: Chronic | ICD-10-CM

## 2020-09-25 DIAGNOSIS — Z98.82 BREAST IMPLANT STATUS: Chronic | ICD-10-CM

## 2020-09-25 DIAGNOSIS — G43.909 MIGRAINE, UNSPECIFIED, NOT INTRACTABLE, WITHOUT STATUS MIGRAINOSUS: ICD-10-CM

## 2020-09-25 LAB — HCG UR QL: NEGATIVE — SIGNIFICANT CHANGE UP

## 2020-09-25 PROCEDURE — 88304 TISSUE EXAM BY PATHOLOGIST: CPT

## 2020-09-25 PROCEDURE — C1713: CPT

## 2020-09-25 PROCEDURE — 29916 HIP ARTHRO W/LABRAL REPAIR: CPT | Mod: RT

## 2020-09-25 PROCEDURE — 76000 FLUOROSCOPY <1 HR PHYS/QHP: CPT

## 2020-09-25 PROCEDURE — 81025 URINE PREGNANCY TEST: CPT

## 2020-09-25 PROCEDURE — 88304 TISSUE EXAM BY PATHOLOGIST: CPT | Mod: 26

## 2020-09-25 PROCEDURE — 29999 UNLISTED PX ARTHROSCOPY: CPT

## 2020-09-25 PROCEDURE — 29914 HIP ARTHRO W/FEMOROPLASTY: CPT | Mod: RT

## 2020-09-25 PROCEDURE — 29861 HIP ARTHRO W/FB REMOVAL: CPT | Mod: RT

## 2020-09-25 RX ORDER — ONDANSETRON 8 MG/1
1 TABLET, FILM COATED ORAL
Qty: 28 | Refills: 0
Start: 2020-09-25 | End: 2020-10-01

## 2020-09-25 RX ORDER — MEPERIDINE HYDROCHLORIDE 50 MG/ML
12.5 INJECTION INTRAMUSCULAR; INTRAVENOUS; SUBCUTANEOUS ONCE
Refills: 0 | Status: DISCONTINUED | OUTPATIENT
Start: 2020-09-25 | End: 2020-09-25

## 2020-09-25 RX ORDER — OXYCODONE HYDROCHLORIDE 5 MG/1
5 TABLET ORAL ONCE
Refills: 0 | Status: DISCONTINUED | OUTPATIENT
Start: 2020-09-25 | End: 2020-09-25

## 2020-09-25 RX ORDER — L.ACIDOPH/B.ANIMALIS/B.LONGUM 15B CELL
1 CAPSULE ORAL
Qty: 0 | Refills: 0 | DISCHARGE

## 2020-09-25 RX ORDER — ONDANSETRON 8 MG/1
4 TABLET, FILM COATED ORAL EVERY 6 HOURS
Refills: 0 | Status: DISCONTINUED | OUTPATIENT
Start: 2020-09-25 | End: 2020-09-25

## 2020-09-25 RX ORDER — ACETAMINOPHEN 500 MG
1000 TABLET ORAL ONCE
Refills: 0 | Status: COMPLETED | OUTPATIENT
Start: 2020-09-25 | End: 2020-09-25

## 2020-09-25 RX ORDER — SODIUM CHLORIDE 9 MG/ML
1000 INJECTION, SOLUTION INTRAVENOUS
Refills: 0 | Status: DISCONTINUED | OUTPATIENT
Start: 2020-09-25 | End: 2020-09-25

## 2020-09-25 RX ORDER — ASPIRIN/CALCIUM CARB/MAGNESIUM 324 MG
1 TABLET ORAL
Qty: 30 | Refills: 0
Start: 2020-09-25 | End: 2020-10-24

## 2020-09-25 RX ORDER — ALPRAZOLAM 0.25 MG
1 TABLET ORAL
Qty: 0 | Refills: 0 | DISCHARGE

## 2020-09-25 RX ORDER — FENTANYL CITRATE 50 UG/ML
50 INJECTION INTRAVENOUS
Refills: 0 | Status: DISCONTINUED | OUTPATIENT
Start: 2020-09-25 | End: 2020-09-25

## 2020-09-25 RX ORDER — KETOROLAC TROMETHAMINE 30 MG/ML
30 SYRINGE (ML) INJECTION ONCE
Refills: 0 | Status: DISCONTINUED | OUTPATIENT
Start: 2020-09-25 | End: 2020-09-25

## 2020-09-25 RX ORDER — OXYCODONE HYDROCHLORIDE 5 MG/1
1 TABLET ORAL
Qty: 30 | Refills: 0
Start: 2020-09-25 | End: 2020-09-29

## 2020-09-25 RX ORDER — INDOMETHACIN 50 MG
1 CAPSULE ORAL
Qty: 42 | Refills: 0
Start: 2020-09-25 | End: 2020-10-08

## 2020-09-25 RX ORDER — ROSUVASTATIN CALCIUM 5 MG/1
1 TABLET ORAL
Qty: 0 | Refills: 0 | DISCHARGE

## 2020-09-25 RX ORDER — LEVOTHYROXINE SODIUM 125 MCG
1 TABLET ORAL
Qty: 0 | Refills: 0 | DISCHARGE

## 2020-09-25 RX ORDER — OXYCODONE HYDROCHLORIDE 5 MG/1
10 TABLET ORAL ONCE
Refills: 0 | Status: DISCONTINUED | OUTPATIENT
Start: 2020-09-25 | End: 2020-09-25

## 2020-09-25 RX ORDER — PANTOPRAZOLE SODIUM 20 MG/1
1 TABLET, DELAYED RELEASE ORAL
Qty: 14 | Refills: 0
Start: 2020-09-25 | End: 2020-10-08

## 2020-09-25 RX ADMIN — Medication 400 MILLIGRAM(S): at 10:27

## 2020-09-25 RX ADMIN — Medication 1000 MILLIGRAM(S): at 10:35

## 2020-09-25 RX ADMIN — FENTANYL CITRATE 50 MICROGRAM(S): 50 INJECTION INTRAVENOUS at 10:26

## 2020-09-25 RX ADMIN — FENTANYL CITRATE 50 MICROGRAM(S): 50 INJECTION INTRAVENOUS at 10:35

## 2020-09-25 RX ADMIN — MEPERIDINE HYDROCHLORIDE 12.5 MILLIGRAM(S): 50 INJECTION INTRAMUSCULAR; INTRAVENOUS; SUBCUTANEOUS at 10:35

## 2020-09-25 RX ADMIN — FENTANYL CITRATE 50 MICROGRAM(S): 50 INJECTION INTRAVENOUS at 11:06

## 2020-09-25 RX ADMIN — Medication 30 MILLIGRAM(S): at 11:06

## 2020-09-25 RX ADMIN — MEPERIDINE HYDROCHLORIDE 12.5 MILLIGRAM(S): 50 INJECTION INTRAMUSCULAR; INTRAVENOUS; SUBCUTANEOUS at 10:26

## 2020-09-25 RX ADMIN — OXYCODONE HYDROCHLORIDE 10 MILLIGRAM(S): 5 TABLET ORAL at 10:27

## 2020-09-25 RX ADMIN — FENTANYL CITRATE 50 MICROGRAM(S): 50 INJECTION INTRAVENOUS at 10:36

## 2020-09-25 RX ADMIN — OXYCODONE HYDROCHLORIDE 10 MILLIGRAM(S): 5 TABLET ORAL at 10:35

## 2020-09-25 RX ADMIN — Medication 30 MILLIGRAM(S): at 11:07

## 2020-09-25 NOTE — ASU DISCHARGE PLAN (ADULT/PEDIATRIC) - ASU DC SPECIAL INSTRUCTIONSFT
Hip Arthroscopy Post op    ACTIVITY: Usually during the first 2 or 3 days, there is not a lot of moving around.  We do encourage you to get about with the crutches intermittently to do your usual daily activities (i.e. use the restroom, etc.)     PAIN MEDS:  We will send an eRx prescription to your pharmacy narcotic pain medication on the day of your surgery.  We do not generally write these before that day.  As your pain gets better, you can switch to Tylenol and/or ibuprofen.     CRUTCHES: Most need crutches for 2-4 weeks.  This means you can touch the heel of your foot down to the floor, but not put all of your weight on it.  If you have an isolated labral tear (no impingement/no osteoplasty), you will need crutches for 2 weeks.     STITCHES:  will be removed at about 2 weeks.     SCHOOL/WORK: Generally, most students miss about a week of school.  For work, it depends on what you do. Time out from work is also variable.  This will not only depend on the procedure but what your work requirements are. If you have a job that is mostly sitting you should be able to return in about a week. If you have a physical job you should discuss this with Dr. Banks or MELANY England.     DRIVING: You can drive once you are fully weight bearing and it is comfortable to do so. You must be able to perform an emergency stop without hesitation. It may be 1-2 weeks before you are comfortable driving if it is your left hip and the car is an automatic.  If surgery is on your right hip then you will not be able to drive from anywhere between 2- 4 weeks. You must be off of pain medication to drive.     PHYSICAL THERAPY: You will get a prescription for this. You can schedule your first PT visit anytime for about one week after.  Therapy is usually two to three times per week.     SHOWERING: You may shower 2 days post-op with a water proof dressing, but soaking in a tub should be avoided until your 2 week appointment to check if wounds are healed.     **Please refer to patient post op info packet given to you at office visit for furter details.

## 2020-09-25 NOTE — ASU PATIENT PROFILE, ADULT - PMH
Chronic neck pain  due to 3 fusions  Migraine    Thyroid cancer  age 31 tx radiation  Thyroid disease

## 2020-09-25 NOTE — ASU PATIENT PROFILE, ADULT - PSH
H/O laparoscopy    History of unilateral salpingectomy  Right  S/P breast augmentation  saline  S/P carpal tunnel release  B/L wrist  S/P cervical spinal fusion  x3 and 4 levels are fused  S/P ectopic pregnancy  X 2  S/P thyroidectomy  3 seperate surgeries  S/P tonsillectomy    S/P umbilical hernia repair, follow-up exam

## 2020-09-25 NOTE — BRIEF OPERATIVE NOTE - NSICDXBRIEFPROCEDURE_GEN_ALL_CORE_FT
PROCEDURES:  Arthroscopy, hip, with femoroplasty 25-Sep-2020 10:08:06 Luther Loja  Arthroscopic labral repair of hip 25-Sep-2020 10:07:47 Luther Loja

## 2020-09-25 NOTE — ASU DISCHARGE PLAN (ADULT/PEDIATRIC) - CARE PROVIDER_API CALL
Rell Banks  [facility]  [address]  [place] [address]  Phone: [phone]  Fax: [phone]  Follow Up Time:

## 2020-10-08 ENCOUNTER — APPOINTMENT (OUTPATIENT)
Dept: ORTHOPEDIC SURGERY | Facility: CLINIC | Age: 50
End: 2020-10-08
Payer: OTHER MISCELLANEOUS

## 2020-10-08 PROBLEM — M54.2 CERVICALGIA: Chronic | Status: ACTIVE | Noted: 2017-09-01

## 2020-10-08 PROCEDURE — 73502 X-RAY EXAM HIP UNI 2-3 VIEWS: CPT | Mod: RT

## 2020-10-08 PROCEDURE — 99024 POSTOP FOLLOW-UP VISIT: CPT

## 2020-10-08 RX ORDER — CYCLOBENZAPRINE HYDROCHLORIDE 5 MG/1
5 TABLET, FILM COATED ORAL 3 TIMES DAILY
Qty: 21 | Refills: 0 | Status: ACTIVE | COMMUNITY
Start: 2020-10-08 | End: 1900-01-01

## 2020-10-08 NOTE — HISTORY OF PRESENT ILLNESS
[___ Days Post Op] : post op day #[unfilled] [Clean/Dry/Intact] : clean, dry and intact [Neuro Intact] : an unremarkable neurological exam [Vascular Intact] : ~T peripheral vascular exam normal [Negative Chriss's] : maneuvers demonstrated a negative Chriss's sign [Xray (Date:___)] : [unfilled] Xray -  [Fair Pain Control] : has fair pain control [Chills] : no chills [Fever] : no fever [Nausea] : no nausea [Vomiting] : no vomiting [Healed] : not healed [Erythema] : not erythematous [Discharge] : absent of discharge [Swelling] : not swollen [Dehiscence] : not dehisced [de-identified] : S/P right hip arthroscopy with femoroplasty, acetabuloplasty, labral repair and capsule repair. DOS: 9/25/2020. [de-identified] : GOMEZ is a 50 year old female who presents today 13 days S/P right hip arthroscopy with femoroplasty, acetabuloplasty, labral repair and capsule repair. She began taking narcotic pain med 24 hrs. post-op which she states reduces her pain slightly.  She finds it hard to find a comfortable position whether sitting or standing.  She has mild pudendal nerve paresthesia.  She presents today on crutches and properly wearing her brace.\par  [de-identified] : Incision sites are clean dry and intact. Stitches were removed and a sterile dressing was placed over the area. No surrounding erythema. No drainage.  Range of motion 0-90° with no pain. Motor and sensation are intact distally.  She does not have numbness over the pudendal area. [de-identified] : AP pelvis, AP hip, and lateral x-rays of the right hip were reviewed. Proper alignment noted. No fracture, no deformity, no dislocation, no bony lesions.\par  [de-identified] : GOMEZ is a 50 year old female who presents today 13 days S/P right hip arthroscopy with femoroplasty, acetabuloplasty, labral repair and capsule repair. She began taking narcotic pain med 24 hrs. post-op which she states reduces her pain slightly.  She finds it hard to find a comfortable position whether sitting or standing.  She has mild pudendal nerve paresthesia.  She presents today on crutches and properly wearing her brace.\par \par She will continue PT 2x/week x 4 weeks until we see her again for clinical reassessment with use of his brace at all times over the next 2 weeks and use of his crutches. Her brace was unlocked to 90 degrees at today's visit. After 2 weeks she will wean off the crutches and brace as tolerated with PT. Her sutures were removed today and without complaints. She agrees with the above plan and all questions were answered.

## 2020-10-18 ENCOUNTER — FORM ENCOUNTER (OUTPATIENT)
Age: 50
End: 2020-10-18

## 2020-10-26 ENCOUNTER — APPOINTMENT (OUTPATIENT)
Dept: SPINE | Facility: CLINIC | Age: 50
End: 2020-10-26
Payer: OTHER MISCELLANEOUS

## 2020-10-26 PROCEDURE — 99212 OFFICE O/P EST SF 10 MIN: CPT | Mod: 95

## 2020-10-26 NOTE — HISTORY OF PRESENT ILLNESS
[FreeTextEntry4] : MILAGRO Murphy. [FreeTextEntry1] : GOMEZ SAINI is a 50 year old lady who underwent a C3-C4 ,C6 -C7 anterior cervical discectomy and fusion in 2017. She has been managing her neck and shoulder pain and stiffness with physical therapy and interventional pain management but had to stop in March due to the COVID pandemic. The patient stated that she recently started physical therapy again which was helping  .She then underwent right hip surgery by Dr. Rell Banks at [facility] on 9/25/ 2020 and now needs to ambulate with crutches while she recovers. This has exacerbated her neck shoulder and arm pain. The patient stated that she would like a new prescription for physical therapy.\par

## 2020-10-26 NOTE — ASSESSMENT
[FreeTextEntry1] : It was discussed that the patient would benefit from physical therapy to relieve her neck shoulder and arm pain and to teach her to use the crutches properly with less aggravation of her neck pain. She was provided with a new prescription for physical therapy. The patient may be a turned to the office on an as-needed basis.

## 2020-10-26 NOTE — PHYSICAL EXAM
[FreeTextEntry1] : The physical exam is limited due to this being a telehealth appointment.  The patient is alert and oriented to person, place and time.  Higher cortical functions are intact.  Face is symmetrical and speech is clear.  She wears a right hip brace and uses crutches to ambulate.\par \par

## 2020-10-29 ENCOUNTER — NON-APPOINTMENT (OUTPATIENT)
Age: 50
End: 2020-10-29

## 2020-11-09 ENCOUNTER — APPOINTMENT (OUTPATIENT)
Dept: ORTHOPEDIC SURGERY | Facility: CLINIC | Age: 50
End: 2020-11-09
Payer: OTHER MISCELLANEOUS

## 2020-11-09 PROCEDURE — 99024 POSTOP FOLLOW-UP VISIT: CPT

## 2020-11-09 NOTE — HISTORY OF PRESENT ILLNESS
[___ Days Post Op] : post op day #[unfilled] [Clean/Dry/Intact] : clean, dry and intact [Neuro Intact] : an unremarkable neurological exam [Vascular Intact] : ~T peripheral vascular exam normal [Negative Chriss's] : maneuvers demonstrated a negative Chriss's sign [Fair Pain Control] : has fair pain control [Chills] : no chills [Fever] : no fever [Nausea] : no nausea [Vomiting] : no vomiting [Healed] : not healed [Erythema] : not erythematous [Discharge] : absent of discharge [Swelling] : not swollen [Dehiscence] : not dehisced [de-identified] : S/P right hip arthroscopy with femoroplasty, acetabuloplasty, labral repair and capsule repair. DOS: 9/25/2020. [de-identified] : GOMEZ is a 50 year old female who presents today  S/P right hip arthroscopy with femoroplasty, acetabuloplasty, labral repair and capsule repair. She began taking narcotic pain med 24 hrs. post-op which she states reduces her pain slightly.  She finds it hard to find a comfortable position whether sitting or standing.  She has mild pudendal nerve paresthesia.  She presents today on crutches and properly wearing her brace.\par  [de-identified] : Right Hip\par \par Incision sites are clean dry and intact. Stitches were removed and a sterile dressing was placed over the area. No surrounding erythema. No drainage.  Range of motion 0-90° with no pain. Motor and sensation are intact distally.  She does have mild numbness over the pudendal area. [de-identified] : No xrays today [de-identified] : GOMEZ is a 50 year old female who presents today S/P right hip arthroscopy with femoroplasty, acetabuloplasty, labral repair and capsule repair. She finds it hard to find a comfortable position whether sitting or standing.  She has mild pudendal nerve paresthesia.  She presents today on crutches and properly wearing her brace.\par \par \par She will continue PT 2x/week x 6 weeks until we see her again for clinical reassessment. She may slowly star to wean off the crutches and the brace as tolerated. She agrees with the above plan and all questions were answered.

## 2020-12-14 ENCOUNTER — APPOINTMENT (OUTPATIENT)
Dept: ORTHOPEDIC SURGERY | Facility: CLINIC | Age: 50
End: 2020-12-14
Payer: OTHER MISCELLANEOUS

## 2020-12-14 PROCEDURE — 99024 POSTOP FOLLOW-UP VISIT: CPT

## 2020-12-14 NOTE — HISTORY OF PRESENT ILLNESS
[___ Months Post Op] : [unfilled] months post op [Clean/Dry/Intact] : clean, dry and intact [Neuro Intact] : an unremarkable neurological exam [Vascular Intact] : ~T peripheral vascular exam normal [Negative Chriss's] : maneuvers demonstrated a negative Chriss's sign [Fair Pain Control] : has fair pain control [Chills] : no chills [Fever] : no fever [Nausea] : no nausea [Vomiting] : no vomiting [Healed] : not healed [Erythema] : not erythematous [Discharge] : absent of discharge [Swelling] : not swollen [Dehiscence] : not dehisced [de-identified] : S/P right hip arthroscopy with femoroplasty, acetabuloplasty, labral repair and capsule repair. DOS: 9/25/2020. [de-identified] : GOMEZ is a 50 year old female who presents today  S/P right hip arthroscopy with femoroplasty, acetabuloplasty, labral repair and capsule repair, 2.5 months ago. Patient presents without crutches and without brace.  She performs PT 2x/week and performs a HEP.  She walks with antalgic gait and tries to focus on her gait. [de-identified] : Right Hip\par \par Incision sites are clean dry and intact. Stitches were removed and a sterile dressing was placed over the area. No surrounding erythema. No drainage.  Range of motion 0-90° with no pain. Motor and sensation are intact distally.  She does have mild numbness over the pudendal area. [de-identified] : No xrays today. [de-identified] : GOMEZ is a 50 year old female who presents today S/P right hip arthroscopy with femoroplasty, acetabuloplasty, labral repair and capsule repair. She finds it hard to find a comfortable position whether sitting or standing.  She has no pudendal nerve paresthesia.  She is currently not wearing her brace she is on crutches.  She is walking with a mild limp from time to time.  She has noticed significant improvement since last visit.  She will continue to do all activities as tolerated.  She will start a home excise program as well.  I will see her back in 4 weeks clinical reassessment.  I also recommended that her physical therapist significantly works on her hip flexor muscles as they are likely cause of most of her symptoms at this time.  She demonstrates understanding plan all questions were answered.

## 2021-01-11 ENCOUNTER — APPOINTMENT (OUTPATIENT)
Dept: ORTHOPEDIC SURGERY | Facility: CLINIC | Age: 51
End: 2021-01-11
Payer: OTHER MISCELLANEOUS

## 2021-01-11 PROCEDURE — 99214 OFFICE O/P EST MOD 30 MIN: CPT

## 2021-01-11 PROCEDURE — 99072 ADDL SUPL MATRL&STAF TM PHE: CPT

## 2021-01-11 RX ORDER — METHYLPREDNISOLONE 4 MG/1
4 TABLET ORAL
Qty: 1 | Refills: 0 | Status: COMPLETED | COMMUNITY
Start: 2021-01-11 | End: 2021-01-17

## 2021-01-12 ENCOUNTER — FORM ENCOUNTER (OUTPATIENT)
Age: 51
End: 2021-01-12

## 2021-01-13 NOTE — PHYSICAL EXAM
[de-identified] : Physical Exam:\par General: Well appearing, no acute distress\par Neurologic: A&Ox3, No focal deficits\par Head: NCAT without abrasions, lacerations, or ecchymosis to head, face, or scalp\par Eyes: No scleral icterus, no gross abnormalities\par Respiratory: Equal chest wall expansion bilaterally, no accessory muscle use\par Lymphatic: No lymphadenopathy palpated\par Skin: Warm and dry\par Psychiatric: Normal mood and affect\par \par Right Hip\par \par Incision sites are clean dry and intact. Fully healed. No surrounding erythema. No drainage.  Range of motion 0-90° without pain. Motor and sensation are intact distally.  She does have mild numbness over the pudendal area. TTP to hip flexor

## 2021-01-13 NOTE — HISTORY OF PRESENT ILLNESS
[Worsening] : worsening [___ mths] : [unfilled] month(s) ago [Hip Movement] : worsened by hip movement [Walking] : worsened by walking [Physical Therapy] : relieved by physical therapy [de-identified] : P [de-identified] : GOMEZ is a 50 year old female who presents today S/P right hip arthroscopy with femoroplasty, acetabuloplasty, labral repair and capsule repair, 3.5 months ago. Patient presents without crutches and without brace. She performs PT 2x/week and performs a HEP. She walks with antalgic gait and tries to focus on her gait. Current symptoms include no chills, no fever, no nausea and no vomiting.\par \par Currently, p/x presents today with increased hip pain, specifically with R hip flexion and hip extension. P/x PT script ended in December. P/x con't use of percocet ( MG). P/x reports shooting pain down posterior leg.

## 2021-01-13 NOTE — REASON FOR VISIT
[Follow-Up Visit] : a follow-up visit for [FreeTextEntry2] : 3.5 months post op. S/P right hip arthroscopy with femoroplasty, acetabuloplasty, labral repair and capsule repair. DOS: 9/25/2020

## 2021-01-13 NOTE — DISCUSSION/SUMMARY
[de-identified] : GOMEZ is a 50 year old female who presents today S/P right hip arthroscopy with femoroplasty, acetabuloplasty, labral repair and capsule repair, 3.5 months ago. Patient presents without crutches and without brace. She performs PT 2x/week and performs a HEP. She walks with antalgic gait and tries to focus on her gait. Current symptoms include no chills, no fever, no nausea and no vomiting.\par \par Currently, patient presents today with increased hip pain, specifically with R hip flexion and hip extension. Patient PT script ended in December. P/x con't use of percocet ( MG). Patient reports shooting pain down posterior leg. \par \par Based off of her clinical exam today and her current symptoms I believe she has tendonitis to her hip flexor. Due to her acute pain she elects at this time for a cortisone injection to this area via ultrasound guidance. She will perform this alongside taking an oral medrol dose pack. She will re-start PT 1 week after receiving her injection. We will see her back in 4-6 weeks for clinical reassessment. She agrees with the above plan and all questions were answered.\par

## 2021-01-18 ENCOUNTER — FORM ENCOUNTER (OUTPATIENT)
Age: 51
End: 2021-01-18

## 2021-02-03 ENCOUNTER — NON-APPOINTMENT (OUTPATIENT)
Age: 51
End: 2021-02-03

## 2021-02-08 ENCOUNTER — APPOINTMENT (OUTPATIENT)
Dept: INTERVENTIONAL RADIOLOGY/VASCULAR | Facility: CLINIC | Age: 51
End: 2021-02-08

## 2021-02-11 ENCOUNTER — APPOINTMENT (OUTPATIENT)
Dept: ORTHOPEDIC SURGERY | Facility: CLINIC | Age: 51
End: 2021-02-11

## 2021-03-11 ENCOUNTER — TRANSCRIPTION ENCOUNTER (OUTPATIENT)
Age: 51
End: 2021-03-11

## 2021-03-17 ENCOUNTER — APPOINTMENT (OUTPATIENT)
Dept: SPINE | Facility: CLINIC | Age: 51
End: 2021-03-17
Payer: OTHER MISCELLANEOUS

## 2021-03-17 VITALS
SYSTOLIC BLOOD PRESSURE: 106 MMHG | HEIGHT: 66 IN | OXYGEN SATURATION: 95 % | WEIGHT: 140 LBS | DIASTOLIC BLOOD PRESSURE: 75 MMHG | BODY MASS INDEX: 22.5 KG/M2 | TEMPERATURE: 97.9 F | HEART RATE: 88 BPM

## 2021-03-17 PROCEDURE — 99212 OFFICE O/P EST SF 10 MIN: CPT

## 2021-03-17 PROCEDURE — 99072 ADDL SUPL MATRL&STAF TM PHE: CPT

## 2021-03-22 ENCOUNTER — APPOINTMENT (OUTPATIENT)
Dept: ULTRASOUND IMAGING | Facility: CLINIC | Age: 51
End: 2021-03-22
Payer: OTHER MISCELLANEOUS

## 2021-03-22 ENCOUNTER — RESULT REVIEW (OUTPATIENT)
Age: 51
End: 2021-03-22

## 2021-03-22 ENCOUNTER — OUTPATIENT (OUTPATIENT)
Dept: OUTPATIENT SERVICES | Facility: HOSPITAL | Age: 51
LOS: 1 days | End: 2021-03-22

## 2021-03-22 DIAGNOSIS — Z98.82 BREAST IMPLANT STATUS: Chronic | ICD-10-CM

## 2021-03-22 DIAGNOSIS — Z98.890 OTHER SPECIFIED POSTPROCEDURAL STATES: Chronic | ICD-10-CM

## 2021-03-22 DIAGNOSIS — Z90.89 ACQUIRED ABSENCE OF OTHER ORGANS: Chronic | ICD-10-CM

## 2021-03-22 DIAGNOSIS — Z98.1 ARTHRODESIS STATUS: Chronic | ICD-10-CM

## 2021-03-22 DIAGNOSIS — M76.891 OTHER SPECIFIED ENTHESOPATHIES OF RIGHT LOWER LIMB, EXCLUDING FOOT: ICD-10-CM

## 2021-03-22 DIAGNOSIS — Z09 ENCOUNTER FOR FOLLOW-UP EXAMINATION AFTER COMPLETED TREATMENT FOR CONDITIONS OTHER THAN MALIGNANT NEOPLASM: Chronic | ICD-10-CM

## 2021-03-22 DIAGNOSIS — Z87.59 PERSONAL HISTORY OF OTHER COMPLICATIONS OF PREGNANCY, CHILDBIRTH AND THE PUERPERIUM: Chronic | ICD-10-CM

## 2021-03-22 DIAGNOSIS — E89.0 POSTPROCEDURAL HYPOTHYROIDISM: Chronic | ICD-10-CM

## 2021-03-22 DIAGNOSIS — Z90.79 ACQUIRED ABSENCE OF OTHER GENITAL ORGAN(S): Chronic | ICD-10-CM

## 2021-03-22 PROCEDURE — 20611 DRAIN/INJ JOINT/BURSA W/US: CPT | Mod: RT

## 2021-04-08 ENCOUNTER — FORM ENCOUNTER (OUTPATIENT)
Age: 51
End: 2021-04-08

## 2021-04-08 ENCOUNTER — APPOINTMENT (OUTPATIENT)
Dept: ORTHOPEDIC SURGERY | Facility: CLINIC | Age: 51
End: 2021-04-08
Payer: OTHER MISCELLANEOUS

## 2021-04-08 VITALS
SYSTOLIC BLOOD PRESSURE: 116 MMHG | BODY MASS INDEX: 22.5 KG/M2 | HEIGHT: 66 IN | WEIGHT: 140 LBS | DIASTOLIC BLOOD PRESSURE: 71 MMHG | HEART RATE: 102 BPM | OXYGEN SATURATION: 98 %

## 2021-04-08 PROCEDURE — 99214 OFFICE O/P EST MOD 30 MIN: CPT

## 2021-04-08 PROCEDURE — 99072 ADDL SUPL MATRL&STAF TM PHE: CPT

## 2021-04-08 NOTE — PHYSICAL EXAM
[de-identified] : Physical Exam:\par General: Well appearing, no acute distress\par Neurologic: A&Ox3, No focal deficits\par Head: NCAT without abrasions, lacerations, or ecchymosis to head, face, or scalp\par Eyes: No scleral icterus, no gross abnormalities\par Respiratory: Equal chest wall expansion bilaterally, no accessory muscle use\par Lymphatic: No lymphadenopathy palpated\par Skin: Warm and dry\par Psychiatric: Normal mood and affect\par \par Right Hip\par \par Incision sites are clean dry and intact. Fully healed. No surrounding erythema. No drainage.  Range of motion 0-90°. Internal rotation to 30°. Weakness with hip flexion, with minimal pain. Motor and sensation are intact distally.  She does have mild numbness over the pudendal area.

## 2021-04-08 NOTE — DISCUSSION/SUMMARY
[de-identified] : GOMEZ is a 51 year old female who presents today S/P right hip arthroscopy with femoroplasty, acetabuloplasty, labral repair and capsule repair, 6 months ago. Patient presents without crutches and without brace. \par She presents today s/p hip injection, which did improve her pain and she was able to be more aggressive with PT. She performs PT 3x/week and performs a HEP. Current symptoms include no chills, no fever, no nausea and no vomiting. \par \par We had a thorough discussion regarding the nature of her pain, the pathophysiology, as well as all treatment options. It is imperative that the patient  continue with physical therapy for strength, flexibility and balance. We will see her back in 4-6 weeks for clinical reassessment. She agrees with the above plan and all questions were answered.\par

## 2021-04-08 NOTE — HISTORY OF PRESENT ILLNESS
[Worsening] : worsening [___ mths] : [unfilled] month(s) ago [Hip Movement] : worsened by hip movement [Walking] : worsened by walking [Physical Therapy] : relieved by physical therapy [de-identified] : GOMEZ is a 51 year old female who presents today S/P right hip arthroscopy with femoroplasty, acetabuloplasty, labral repair and capsule repair, 6 months ago. Patient presents without crutches and without brace. \par She presents today s/p hip injection, which did improve her pain and she was able to be more aggressive with PT. She performs PT 3x/week and performs a HEP. Current symptoms include no chills, no fever, no nausea and no vomiting.

## 2021-05-25 ENCOUNTER — FORM ENCOUNTER (OUTPATIENT)
Age: 51
End: 2021-05-25

## 2021-05-27 ENCOUNTER — APPOINTMENT (OUTPATIENT)
Dept: ORTHOPEDIC SURGERY | Facility: CLINIC | Age: 51
End: 2021-05-27
Payer: OTHER MISCELLANEOUS

## 2021-05-27 PROCEDURE — 99442: CPT

## 2021-05-31 ENCOUNTER — FORM ENCOUNTER (OUTPATIENT)
Age: 51
End: 2021-05-31

## 2021-06-06 ENCOUNTER — FORM ENCOUNTER (OUTPATIENT)
Age: 51
End: 2021-06-06

## 2021-06-14 ENCOUNTER — APPOINTMENT (OUTPATIENT)
Dept: ORTHOPEDIC SURGERY | Facility: CLINIC | Age: 51
End: 2021-06-14
Payer: OTHER MISCELLANEOUS

## 2021-06-14 ENCOUNTER — FORM ENCOUNTER (OUTPATIENT)
Age: 51
End: 2021-06-14

## 2021-06-14 VITALS
HEART RATE: 93 BPM | BODY MASS INDEX: 22.5 KG/M2 | DIASTOLIC BLOOD PRESSURE: 74 MMHG | HEIGHT: 66 IN | SYSTOLIC BLOOD PRESSURE: 112 MMHG | WEIGHT: 140 LBS

## 2021-06-14 PROCEDURE — 99214 OFFICE O/P EST MOD 30 MIN: CPT

## 2021-06-14 PROCEDURE — 99072 ADDL SUPL MATRL&STAF TM PHE: CPT

## 2021-06-14 NOTE — HISTORY OF PRESENT ILLNESS
[___ mths] : [unfilled] month(s) ago [de-identified] : GOMEZ SAINI is a 51 year female being seen for S/P right hip arthroscopy with femoroplasty, acetabuloplasty, labral repair and capsule repair, 9 months ago. Currently, she has continued right hip flexor pain secondary to WC delay in her physical therapy and ultra-sound guided injection. In the past formal PT has resolved her symptoms alongside an u/s guided injection. She still awaiting approval. Since the delay in her physical therapy she has developed other symptoms of pain with outer hip pain and knee pain.\par

## 2021-06-14 NOTE — REASON FOR VISIT
[Follow-Up Visit] : a follow-up visit for [FreeTextEntry2] : S/P right hip arthroscopy with femoroplasty, acetabuloplasty, labral repair and capsule repair. DOS: 9/25/2020.

## 2021-06-14 NOTE — PHYSICAL EXAM
[de-identified] : Physical Exam:\par General: Well appearing, no acute distress\par Neurologic: A&Ox3, No focal deficits\par Head: NCAT without abrasions, lacerations, or ecchymosis to head, face, or scalp\par Eyes: No scleral icterus, no gross abnormalities\par Respiratory: Equal chest wall expansion bilaterally, no accessory muscle use\par Lymphatic: No lymphadenopathy palpated\par Skin: Warm and dry\par Psychiatric: Normal mood and affect\par \par Right Hip\par \par Incision sites are clean dry and intact. Fully healed. No surrounding erythema. No drainage.  Range of motion 0-90°. Internal rotation to 30° with pelvic pain. Weakness with hip flexion with moderate pain. Focal area of tenderness over greater trochanteric bursa and alongside entire ITB.  Motor and sensation are intact distally.  She does have mild numbness over the pudendal area. \par \par Right Knee\par \par Pes anserine bursa, TTP\par Mild joint effusion, no warmth, no erythema, painful at extreme ROM

## 2021-06-14 NOTE — DISCUSSION/SUMMARY
[de-identified] : GOMEZ SAINI is a 51 year female being seen for S/P right hip arthroscopy with femoroplasty, acetabuloplasty, labral repair and capsule repair, 9 months ago. Currently, she has continued right hip flexor pain secondary to WC delay in her physical therapy and ultra-sound guided injection. In the past formal PT has resolved her symptoms alongside an u/s guided injection. She still awaiting approval. Since the delay in her physical therapy she has developed other symptoms of pain with outer hip pain and knee pain.\par \par We had a thorough discussion regarding the nature of her pain, the pathophysiology, as well as all treatment options. On clinical exam today she has compensatory pes anserine bursitis due to delay in formal PT by workers compensation. Her right hip flexor pain has worsened with new onset ITB tendonitis and greater trochanteric bursitis. Due to this she medically necessitates restarting formal PT. The script ordered on May 27th is already pending approval, thus a new script was not put in today. She also necessitates PT around the same time as the intra-articular injection since conjunctive therapy is proven to work more effectively. She will start formal PT 1 week after her iliopsoas injection. She agrees with the above plan and all questions were answered.\par

## 2021-06-14 NOTE — REVIEW OF SYSTEMS
[FreeTextEntry9] : S/P right hip arthroscopy with femoroplasty, acetabuloplasty, labral repair and capsule repair. DOS: 9/25/2020.

## 2021-08-24 ENCOUNTER — APPOINTMENT (OUTPATIENT)
Dept: ORTHOPEDIC SURGERY | Facility: CLINIC | Age: 51
End: 2021-08-24
Payer: OTHER MISCELLANEOUS

## 2021-08-24 DIAGNOSIS — M25.561 PAIN IN RIGHT KNEE: ICD-10-CM

## 2021-08-24 DIAGNOSIS — M70.51 OTHER BURSITIS OF KNEE, RIGHT KNEE: ICD-10-CM

## 2021-08-24 PROCEDURE — 99072 ADDL SUPL MATRL&STAF TM PHE: CPT

## 2021-08-24 PROCEDURE — 73564 X-RAY EXAM KNEE 4 OR MORE: CPT | Mod: RT

## 2021-08-24 PROCEDURE — 99214 OFFICE O/P EST MOD 30 MIN: CPT | Mod: 25

## 2021-08-24 PROCEDURE — 20610 DRAIN/INJ JOINT/BURSA W/O US: CPT | Mod: RT

## 2021-08-24 NOTE — PHYSICAL EXAM
[de-identified] : Physical Exam:\par General: Well appearing, no acute distress\par Neurologic: A&Ox3, No focal deficits\par Head: NCAT without abrasions, lacerations, or ecchymosis to head, face, or scalp\par Eyes: No scleral icterus, no gross abnormalities\par Respiratory: Equal chest wall expansion bilaterally, no accessory muscle use\par Lymphatic: No lymphadenopathy palpated\par Skin: Warm and dry\par Psychiatric: Normal mood and affect\par \par Right Hip\par \par Incision sites are clean dry and intact. Fully healed. No surrounding erythema. No drainage.  Range of motion 0-90°. Internal rotation to 30° with pelvic pain. Weakness with hip flexion with moderate pain. Focal area of tenderness over greater trochanteric bursa and alongside entire ITB.  Motor and sensation are intact distally.  She does have mild numbness over the pudendal area. \par \par Right Knee\par \par Pes anserine bursa, posterior tibia TTP\par Mild joint effusion, no warmth, no erythema, painful at extreme ROM. Negative Lachman. Negative pivot shift. Negative anterior drawer test. Negative posterior drawer test. Negative Edie. Negative Apley grind. No tenderness over the medial and lateral facet of the patella. No patellofemoral crepitations. No lateral tilting patella. No patellar apprehension. No varus or valgus malalignment. Skin is intact. No rashes, scars or lesions.\par \par Left Knee: Range of Motion in Degrees	\par 	  Claimant:	Normal:	\par Flexion Active	 135 	 135-degrees	\par Flexion Passive	 135	 135-degrees	\par Extension Active	 0-5	 0-5-degrees	\par Extension Passive	 0-5	 0-5-degrees	\par \par No weakness to flexion/extension. No evidence of instability in the AP plane or varus or valgus stress. Negative Lachman. Negative pivot shift. Negative anterior drawer test. Negative posterior drawer test. Negative Edie. Negative Apley grind. No medial or lateral joint line tenderness. No tenderness over the medial and lateral facet of the patella. No patellofemoral crepitations. No lateral tilting patella. No patellar apprehension. No crepitation in the medial and lateral femoral condyle. No proximal or distal swelling, edema or tenderness. No gross motor or sensory deficits. No intra-articular swelling. 2+ DP and PT pulses. No varus or valgus malalignment. Skin is intact. No rashes, scars or lesions.  [de-identified] : 4 views of R knee were performed today and available for me to review. Results were discussed with the patient. They demonstrate no f/x, dislocation or other deformity.\par

## 2021-08-24 NOTE — ADDENDUM
[FreeTextEntry1] : Documented by Bimal Acosta acting as a scribe for Dr. Banks on 08/24/2021. \par \par All medical record entries made by the Scribe were at my, Dr. Banks's, direction and\par personally dictated by me on 08/24/2021. I have reviewed the chart and agree that the record\par accurately reflects my personal performance of the history, physical exam, procedure and imaging.

## 2021-08-24 NOTE — HISTORY OF PRESENT ILLNESS
[___ mths] : [unfilled] month(s) ago [Hip Movement] : worsened by hip movement [Walking] : worsened by walking [Physical Therapy] : relieved by physical therapy [de-identified] : GOMEZ SAINI is a 51 year female being seen for S/P right hip arthroscopy with femoroplasty, acetabuloplasty, labral repair and capsule repair, 11 months ago. Currently, she has continued right hip flexor pain secondary to  delay in her physical therapy and ultra-sound guided injection. C$ forms have been sent to her WC team on 06/01/2021 and 06/15/2021. In the past formal PT has resolved her symptoms alongside an u/s guided injection. She still awaiting approval. Since the delay in her physical therapy she has developed other symptoms of pain with outer hip pain and R knee pain. She presents visibly limping due to hip and knee pain. In terms of her R knee, she endorses medial knee pain that is sharp in nature and instability. She reports most pain with squatting. She reports no relief with anti-inflammatories. \par

## 2021-08-24 NOTE — PROCEDURE
[de-identified] : Injection: Right knee\par Indication: Pes Anserine Bursitis\par \par A discussion was had with the patient regarding this procedure and all questions were answered. All risks, benefits and alternatives were discussed. These included but were not limited to bleeding, infection, and allergic reaction. Alcohol was used to clean the skin, and betadine was used to sterilize and prep the area in the pes anserine bursa aspect of the right knee. Ethyl chloride spray was then used as a topical anesthetic. A 22-gauge needle was used to inject 1cc of 1% Xylocaine and 1cc of 40mg/mL Triamcinolone Acetonide into the right knee. A sterile bandage was then applied. The patient tolerated the procedure well and there were no complications\par

## 2021-08-24 NOTE — DISCUSSION/SUMMARY
[de-identified] : GOMEZ SAINI is a 51 year female being seen for S/P right hip arthroscopy with femoroplasty, acetabuloplasty, labral repair and capsule repair, 11 months ago. Currently, she has continued right hip flexor pain secondary to WC delay in her physical therapy and ultra-sound guided injection. C$ forms have been sent to her WC team on 06/01/2021 and 06/15/2021. In the past formal PT has resolved her symptoms alongside an u/s guided injection. She still awaiting approval. Since the delay in her physical therapy she has developed other symptoms of pain with outer hip pain and R knee pain. She presents visibly limping due to hip and knee pain. In terms of her R knee, she endorses medial knee pain that is sharp in nature and instability. She reports most pain with squatting. She reports no relief with anti-inflammatories. \par \par With regards to her right knee/leg pain, after review of pts normal radiographs and her clinical exam I believe his primary complaint to be pes anserine bursitis of her right knee of which conservative measures are indicated. She is to refrain from jumping and landing onto flat feet. Due to her acute pain she elects for injection today that she tolerated well. Pt to perform PT as well 2x/week x 6-8 weeks starting next wk, until we see her again. At that time we will see her for clinical reassessment. She was also advised to follow up with  for hip flexor injection as it might provided her relief. Pt agrees to the above plan and all questions were answered.\par

## 2021-09-03 ENCOUNTER — APPOINTMENT (OUTPATIENT)
Dept: ULTRASOUND IMAGING | Facility: CLINIC | Age: 51
End: 2021-09-03
Payer: OTHER MISCELLANEOUS

## 2021-09-03 ENCOUNTER — OUTPATIENT (OUTPATIENT)
Dept: OUTPATIENT SERVICES | Facility: HOSPITAL | Age: 51
LOS: 1 days | End: 2021-09-03
Payer: COMMERCIAL

## 2021-09-03 ENCOUNTER — RESULT REVIEW (OUTPATIENT)
Age: 51
End: 2021-09-03

## 2021-09-03 DIAGNOSIS — Z98.890 OTHER SPECIFIED POSTPROCEDURAL STATES: Chronic | ICD-10-CM

## 2021-09-03 DIAGNOSIS — Z98.82 BREAST IMPLANT STATUS: Chronic | ICD-10-CM

## 2021-09-03 DIAGNOSIS — Z90.89 ACQUIRED ABSENCE OF OTHER ORGANS: Chronic | ICD-10-CM

## 2021-09-03 DIAGNOSIS — E89.0 POSTPROCEDURAL HYPOTHYROIDISM: Chronic | ICD-10-CM

## 2021-09-03 DIAGNOSIS — Z00.8 ENCOUNTER FOR OTHER GENERAL EXAMINATION: ICD-10-CM

## 2021-09-03 DIAGNOSIS — Z98.1 ARTHRODESIS STATUS: Chronic | ICD-10-CM

## 2021-09-03 DIAGNOSIS — Z09 ENCOUNTER FOR FOLLOW-UP EXAMINATION AFTER COMPLETED TREATMENT FOR CONDITIONS OTHER THAN MALIGNANT NEOPLASM: Chronic | ICD-10-CM

## 2021-09-03 DIAGNOSIS — Z90.79 ACQUIRED ABSENCE OF OTHER GENITAL ORGAN(S): Chronic | ICD-10-CM

## 2021-09-03 DIAGNOSIS — Z87.59 PERSONAL HISTORY OF OTHER COMPLICATIONS OF PREGNANCY, CHILDBIRTH AND THE PUERPERIUM: Chronic | ICD-10-CM

## 2021-09-03 PROCEDURE — 20611 DRAIN/INJ JOINT/BURSA W/US: CPT | Mod: RT

## 2021-09-03 PROCEDURE — 20611 DRAIN/INJ JOINT/BURSA W/US: CPT

## 2021-09-12 ENCOUNTER — FORM ENCOUNTER (OUTPATIENT)
Age: 51
End: 2021-09-12

## 2021-09-13 ENCOUNTER — APPOINTMENT (OUTPATIENT)
Dept: SPINE | Facility: CLINIC | Age: 51
End: 2021-09-13
Payer: OTHER MISCELLANEOUS

## 2021-09-13 VITALS — WEIGHT: 143 LBS | BODY MASS INDEX: 23.08 KG/M2

## 2021-09-13 VITALS
WEIGHT: 140 LBS | SYSTOLIC BLOOD PRESSURE: 114 MMHG | DIASTOLIC BLOOD PRESSURE: 79 MMHG | HEART RATE: 79 BPM | OXYGEN SATURATION: 96 % | BODY MASS INDEX: 22.5 KG/M2 | HEIGHT: 66 IN

## 2021-09-13 DIAGNOSIS — M51.26 OTHER INTERVERTEBRAL DISC DISPLACEMENT, LUMBAR REGION: ICD-10-CM

## 2021-09-13 DIAGNOSIS — M50.20 OTHER CERVICAL DISC DISPLACEMENT, UNSPECIFIED CERVICAL REGION: ICD-10-CM

## 2021-09-13 PROCEDURE — 99211 OFF/OP EST MAY X REQ PHY/QHP: CPT

## 2021-09-13 PROCEDURE — 99072 ADDL SUPL MATRL&STAF TM PHE: CPT

## 2021-09-13 RX ORDER — PREDNISONE 5 MG/1
5 TABLET ORAL
Qty: 35 | Refills: 0 | Status: COMPLETED | COMMUNITY
Start: 2021-02-04 | End: 2021-09-13

## 2021-09-20 ENCOUNTER — APPOINTMENT (OUTPATIENT)
Dept: INTERVENTIONAL RADIOLOGY/VASCULAR | Facility: CLINIC | Age: 51
End: 2021-09-20

## 2021-10-14 ENCOUNTER — APPOINTMENT (OUTPATIENT)
Dept: ORTHOPEDIC SURGERY | Facility: CLINIC | Age: 51
End: 2021-10-14
Payer: OTHER MISCELLANEOUS

## 2021-10-14 ENCOUNTER — NON-APPOINTMENT (OUTPATIENT)
Age: 51
End: 2021-10-14

## 2021-10-14 ENCOUNTER — FORM ENCOUNTER (OUTPATIENT)
Age: 51
End: 2021-10-14

## 2021-10-14 DIAGNOSIS — M25.851 OTHER SPECIFIED JOINT DISORDERS, RIGHT HIP: ICD-10-CM

## 2021-10-14 DIAGNOSIS — S73.191A OTHER SPRAIN OF RIGHT HIP, INITIAL ENCOUNTER: ICD-10-CM

## 2021-10-14 DIAGNOSIS — M16.11 UNILATERAL PRIMARY OSTEOARTHRITIS, RIGHT HIP: ICD-10-CM

## 2021-10-14 DIAGNOSIS — M76.31 ILIOTIBIAL BAND SYNDROME, RIGHT LEG: ICD-10-CM

## 2021-10-14 DIAGNOSIS — M76.891 OTHER SPECIFIED ENTHESOPATHIES OF RIGHT LOWER LIMB, EXCLUDING FOOT: ICD-10-CM

## 2021-10-14 PROCEDURE — 73502 X-RAY EXAM HIP UNI 2-3 VIEWS: CPT | Mod: RT

## 2021-10-14 PROCEDURE — 99214 OFFICE O/P EST MOD 30 MIN: CPT

## 2021-10-14 PROCEDURE — 72190 X-RAY EXAM OF PELVIS: CPT

## 2021-10-14 PROCEDURE — 99072 ADDL SUPL MATRL&STAF TM PHE: CPT

## 2021-10-14 NOTE — DISCUSSION/SUMMARY
[de-identified] : GOMEZ SAINI is a 51 year female being seen for S/P right hip arthroscopy with femoroplasty, acetabuloplasty, labral repair and capsule repair, 1.1 yr ago. Currently, she report moderate relief following cortisone injection. However, she reports continuous pain in hip flexor. She is thinking about surgical intervention at this time. \par \par We had a thorough discussion regarding the nature of her pain, the pathophysiology, as well as all treatment options. I discussed operative and non-operative treatment modalities. Considering the patient's current presentation of pain being worse than prior to corticosteroid injection and failing on greater than 3 months of conservative measures such as PT, NSAIDs, HEP, an MRI arthrogram is indicated at this time. A prescription for this was given to the patient. We will go over the MRI results with her upon obtaining the results in the office or TTM and advise her of further treatment options. Today we have started discussion regarding hip arthroscopy, Z tenotomy. I discussed with her the advantages and disadvantages. We discussed postoperative plan and I provided her with a packet that discusses risks benefits and postoperative rehabilitation in detail. I also stated her that this procedure may not alleviate all of her symptoms. All risks, benefits and alternatives to the proposed surgical procedure, [insert laterality] hip arthroscopy with femoroplasty and labral repair, were discussed in great detail with the patient. Risks include but are not limited to pain, bleeding, infection, stiffness,  medical complications (including DVT, PE, MI), and risks of anesthesia. A packet was given to patient that describes pathophysiology, entire procedure, likely outcome, risks, and benefits, along with post operative physical therapy plan.  The patient expressed understanding and all questions were answered. All questions were answered and the patient verbalized understanding. The patient is in agreement with this treatment plan. \par

## 2021-10-14 NOTE — HISTORY OF PRESENT ILLNESS
[___ mths] : [unfilled] month(s) ago [Hip Movement] : worsened by hip movement [Walking] : worsened by walking [Physical Therapy] : relieved by physical therapy [de-identified] : GOMEZ SAINI is a 51 year female being seen for S/P right hip arthroscopy with femoroplasty, acetabuloplasty, labral repair and capsule repair, 1.1 yr ago. Currently, she report moderate relief following cortisone injection to R hip flexor on 09/03/2021. However, she reports continuous pain in hip flexor. She is thinking about surgical intervention at this time. \par

## 2021-10-14 NOTE — ADDENDUM
[FreeTextEntry1] : Documented by Bimal Acosta acting as a scribe for Dr. Banks on 10/14/2021. \par \par All medical record entries made by the Scribe were at my, Dr. Banks's, direction and\par personally dictated by me on 10/14/2021. I have reviewed the chart and agree that the record\par accurately reflects my personal performance of the history, physical exam, procedure and imaging.

## 2021-10-14 NOTE — PHYSICAL EXAM
[de-identified] : Physical Exam:\par General: Well appearing, no acute distress\par Neurologic: A&Ox3, No focal deficits\par Head: NCAT without abrasions, lacerations, or ecchymosis to head, face, or scalp\par Eyes: No scleral icterus, no gross abnormalities\par Respiratory: Equal chest wall expansion bilaterally, no accessory muscle use\par Lymphatic: No lymphadenopathy palpated\par Skin: Warm and dry\par Psychiatric: Normal mood and affect\par \par Right Hip\par \par Incision sites are clean dry and intact. Fully healed. No surrounding erythema. No drainage.  Range of motion 0-90°. Internal rotation to 30° with pelvic pain. Weakness with hip flexion with moderate pain. Focal area of tenderness over greater trochanteric bursa and alongside entire ITB.  Motor and sensation are intact distally.  She does have mild numbness over the pudendal area. \par \par Right Knee\par \par Pes anserine bursa, posterior tibia TTP\par Mild joint effusion, no warmth, no erythema, painful at extreme ROM. Negative Lachman. Negative pivot shift. Negative anterior drawer test. Negative posterior drawer test. Negative Edie. Negative Apley grind. No tenderness over the medial and lateral facet of the patella. No patellofemoral crepitations. No lateral tilting patella. No patellar apprehension. No varus or valgus malalignment. Skin is intact. No rashes, scars or lesions.\par \par Left Knee: Range of Motion in Degrees	\par 	  Claimant:	Normal:	\par Flexion Active	 135 	 135-degrees	\par Flexion Passive	 135	 135-degrees	\par Extension Active	 0-5	 0-5-degrees	\par Extension Passive	 0-5	 0-5-degrees	\par \par No weakness to flexion/extension. No evidence of instability in the AP plane or varus or valgus stress. Negative Lachman. Negative pivot shift. Negative anterior drawer test. Negative posterior drawer test. Negative Edie. Negative Apley grind. No medial or lateral joint line tenderness. No tenderness over the medial and lateral facet of the patella. No patellofemoral crepitations. No lateral tilting patella. No patellar apprehension. No crepitation in the medial and lateral femoral condyle. No proximal or distal swelling, edema or tenderness. No gross motor or sensory deficits. No intra-articular swelling. 2+ DP and PT pulses. No varus or valgus malalignment. Skin is intact. No rashes, scars or lesions.  [de-identified] : 4 views right hip and pelvis were taken today and reviewed with the patient today. Xrays today compared to xrays taken one year ago stable OA, minimal progression, otherwise normal.

## 2021-10-26 ENCOUNTER — FORM ENCOUNTER (OUTPATIENT)
Age: 51
End: 2021-10-26

## 2021-11-12 ENCOUNTER — NON-APPOINTMENT (OUTPATIENT)
Age: 51
End: 2021-11-12

## 2021-11-15 ENCOUNTER — APPOINTMENT (OUTPATIENT)
Dept: ORTHOPEDIC SURGERY | Facility: CLINIC | Age: 51
End: 2021-11-15
Payer: OTHER MISCELLANEOUS

## 2021-11-15 DIAGNOSIS — M25.551 PAIN IN RIGHT HIP: ICD-10-CM

## 2021-11-15 DIAGNOSIS — Z98.890 OTHER SPECIFIED POSTPROCEDURAL STATES: ICD-10-CM

## 2021-11-15 PROCEDURE — 99443: CPT

## 2021-11-29 ENCOUNTER — NON-APPOINTMENT (OUTPATIENT)
Age: 51
End: 2021-11-29

## 2022-01-24 ENCOUNTER — NON-APPOINTMENT (OUTPATIENT)
Age: 52
End: 2022-01-24

## 2022-02-01 ENCOUNTER — NON-APPOINTMENT (OUTPATIENT)
Age: 52
End: 2022-02-01

## 2022-02-08 ENCOUNTER — NON-APPOINTMENT (OUTPATIENT)
Age: 52
End: 2022-02-08

## 2024-05-17 ENCOUNTER — APPOINTMENT (OUTPATIENT)
Dept: OBGYN | Facility: CLINIC | Age: 54
End: 2024-05-17

## 2024-05-17 VITALS
WEIGHT: 141 LBS | SYSTOLIC BLOOD PRESSURE: 92 MMHG | DIASTOLIC BLOOD PRESSURE: 64 MMHG | BODY MASS INDEX: 22.66 KG/M2 | HEIGHT: 66 IN

## 2024-05-17 DIAGNOSIS — D21.9 BENIGN NEOPLASM OF CONNECTIVE AND OTHER SOFT TISSUE, UNSPECIFIED: ICD-10-CM

## 2024-05-17 DIAGNOSIS — Z00.00 ENCOUNTER FOR GENERAL ADULT MEDICAL EXAMINATION W/OUT ABNORMAL FINDINGS: ICD-10-CM

## 2024-05-17 LAB
BILIRUB UR QL STRIP: NEGATIVE
CLARITY UR: CLEAR
COLLECTION METHOD: NORMAL
GLUCOSE UR-MCNC: NEGATIVE
HCG UR QL: 0.2 EU/DL
HGB UR QL STRIP.AUTO: NORMAL
KETONES UR-MCNC: NEGATIVE
LEUKOCYTE ESTERASE UR QL STRIP: NORMAL
NITRITE UR QL STRIP: NEGATIVE
PH UR STRIP: 6
PROT UR STRIP-MCNC: NEGATIVE
SP GR UR STRIP: 1.02

## 2024-05-17 PROCEDURE — 81003 URINALYSIS AUTO W/O SCOPE: CPT | Mod: QW

## 2024-05-17 PROCEDURE — G0101: CPT

## 2024-05-17 NOTE — REVIEW OF SYSTEMS
[Back Pain] : back pain [FreeTextEntry7] : Left lower quadrant pain described as a pressure sensation

## 2024-05-17 NOTE — PHYSICAL EXAM
[Alert] : alert [No Lymphadenopathy] : no lymphadenopathy [Soft] : soft [Non-tender] : non-tender [Non-distended] : non-distended [No HSM] : No HSM [No Mass] : no mass [FreeTextEntry6] : Status post breast implants. soft, fibrocystic and glandular bilaterally. no predominate nodules or lymphadenopathy [Labia Majora] : normal [Labia Minora] : normal [Normal] : normal [Uterine Adnexae] : normal [FreeTextEntry9] : No palpable masses

## 2024-05-17 NOTE — PLAN
[FreeTextEntry1] : Wellness exam.  Normal physical examination.  Recommendations: Mammography with bilateral breast ultrasound.  Status post colonoscopy in 2022.  Recommend dermatological examination.  Left-sided lower back pain with radiation to the left lower quadrant of the abdomen.  Status post CAT scan notable for renal cysts, and a calcified fibroid uterus.  Will obtain a pelvic ultrasound.

## 2024-05-17 NOTE — HISTORY OF PRESENT ILLNESS
[TextBox_4] : 54-year-old  4 para 2-0-2-2 postmenopausal presents for an annual examination.  Review of systems are notable for left-sided lower back pain

## 2024-05-19 LAB
APPEARANCE: CLEAR
BILIRUBIN URINE: NEGATIVE
BLOOD URINE: NEGATIVE
COLOR: YELLOW
GLUCOSE QUALITATIVE U: NEGATIVE MG/DL
KETONES URINE: NEGATIVE MG/DL
LEUKOCYTE ESTERASE URINE: NEGATIVE
NITRITE URINE: NEGATIVE
PH URINE: 6
PROTEIN URINE: NEGATIVE MG/DL
SPECIFIC GRAVITY URINE: 1.02
UROBILINOGEN URINE: 0.2 MG/DL

## 2024-05-20 LAB — HPV HIGH+LOW RISK DNA PNL CVX: NOT DETECTED

## 2024-05-22 LAB — CYTOLOGY CVX/VAG DOC THIN PREP: ABNORMAL

## 2024-06-07 ENCOUNTER — NON-APPOINTMENT (OUTPATIENT)
Age: 54
End: 2024-06-07

## 2024-06-07 DIAGNOSIS — Z92.89 PERSONAL HISTORY OF OTHER MEDICAL TREATMENT: ICD-10-CM

## 2024-06-07 DIAGNOSIS — Z80.3 FAMILY HISTORY OF MALIGNANT NEOPLASM OF BREAST: ICD-10-CM

## 2024-06-07 DIAGNOSIS — Z63.5 DISRUPTION OF FAMILY BY SEPARATION AND DIVORCE: ICD-10-CM

## 2024-06-07 DIAGNOSIS — Z86.19 PERSONAL HISTORY OF OTHER INFECTIOUS AND PARASITIC DISEASES: ICD-10-CM

## 2024-06-07 DIAGNOSIS — Z83.3 FAMILY HISTORY OF DIABETES MELLITUS: ICD-10-CM

## 2024-06-07 DIAGNOSIS — Z87.42 PERSONAL HISTORY OF OTHER DISEASES OF THE FEMALE GENITAL TRACT: ICD-10-CM

## 2024-06-07 DIAGNOSIS — Z82.62 FAMILY HISTORY OF OSTEOPOROSIS: ICD-10-CM

## 2024-06-07 DIAGNOSIS — Z78.9 OTHER SPECIFIED HEALTH STATUS: ICD-10-CM

## 2024-06-07 DIAGNOSIS — Z80.7 FAMILY HISTORY OF OTHER MALIGNANT NEOPLASMS OF LYMPHOID, HEMATOPOIETIC AND RELATED TISSUES: ICD-10-CM

## 2024-06-07 DIAGNOSIS — Z86.39 PERSONAL HISTORY OF OTHER ENDOCRINE, NUTRITIONAL AND METABOLIC DISEASE: ICD-10-CM

## 2024-06-07 DIAGNOSIS — N90.89 OTHER SPECIFIED NONINFLAMMATORY DISORDERS OF VULVA AND PERINEUM: ICD-10-CM

## 2024-06-07 DIAGNOSIS — Z80.42 FAMILY HISTORY OF MALIGNANT NEOPLASM OF PROSTATE: ICD-10-CM

## 2024-06-07 DIAGNOSIS — Z98.890 OTHER SPECIFIED POSTPROCEDURAL STATES: ICD-10-CM

## 2024-06-07 DIAGNOSIS — Z82.49 FAMILY HISTORY OF ISCHEMIC HEART DISEASE AND OTHER DISEASES OF THE CIRCULATORY SYSTEM: ICD-10-CM

## 2024-06-07 SDOH — SOCIAL STABILITY - SOCIAL INSECURITY: DISRUPTION OF FAMILY BY SEPARATION AND DIVORCE: Z63.5

## 2025-05-06 NOTE — H&P PST ADULT - HIV STATUS
Bed: 47  Expected date:   Expected time:   Means of arrival:   Comments:  ASC 12     Negative/Unknown

## 2025-05-19 DIAGNOSIS — Z12.39 ENCOUNTER FOR OTHER SCREENING FOR MALIGNANT NEOPLASM OF BREAST: ICD-10-CM

## 2025-05-19 DIAGNOSIS — Z12.11 ENCOUNTER FOR SCREENING FOR MALIGNANT NEOPLASM OF COLON: ICD-10-CM

## 2025-05-21 ENCOUNTER — APPOINTMENT (OUTPATIENT)
Age: 55
End: 2025-05-21
Payer: MEDICARE

## 2025-05-21 VITALS
DIASTOLIC BLOOD PRESSURE: 74 MMHG | OXYGEN SATURATION: 99 % | HEART RATE: 85 BPM | SYSTOLIC BLOOD PRESSURE: 108 MMHG | WEIGHT: 151 LBS | BODY MASS INDEX: 24.37 KG/M2 | RESPIRATION RATE: 14 BRPM

## 2025-05-21 DIAGNOSIS — N76.0 ACUTE VAGINITIS: ICD-10-CM

## 2025-05-21 DIAGNOSIS — N95.8 OTHER SPECIFIED MENOPAUSAL AND PERIMENOPAUSAL DISORDERS: ICD-10-CM

## 2025-05-21 DIAGNOSIS — Z98.82 BREAST IMPLANT STATUS: ICD-10-CM

## 2025-05-21 PROCEDURE — 99213 OFFICE O/P EST LOW 20 MIN: CPT

## 2025-07-16 ENCOUNTER — NON-APPOINTMENT (OUTPATIENT)
Age: 55
End: 2025-07-16